# Patient Record
Sex: FEMALE | Race: WHITE | Employment: FULL TIME | URBAN - METROPOLITAN AREA
[De-identification: names, ages, dates, MRNs, and addresses within clinical notes are randomized per-mention and may not be internally consistent; named-entity substitution may affect disease eponyms.]

---

## 2018-07-03 ENCOUNTER — OFFICE VISIT (OUTPATIENT)
Dept: INTERNAL MEDICINE CLINIC | Facility: CLINIC | Age: 43
End: 2018-07-03

## 2018-07-03 VITALS
TEMPERATURE: 99 F | HEIGHT: 61 IN | HEART RATE: 80 BPM | SYSTOLIC BLOOD PRESSURE: 104 MMHG | RESPIRATION RATE: 17 BRPM | BODY MASS INDEX: 34.93 KG/M2 | WEIGHT: 185 LBS | DIASTOLIC BLOOD PRESSURE: 68 MMHG

## 2018-07-03 DIAGNOSIS — Z12.31 VISIT FOR SCREENING MAMMOGRAM: ICD-10-CM

## 2018-07-03 DIAGNOSIS — I10 ESSENTIAL HYPERTENSION: Primary | ICD-10-CM

## 2018-07-03 PROCEDURE — 99203 OFFICE O/P NEW LOW 30 MIN: CPT | Performed by: INTERNAL MEDICINE

## 2018-07-03 RX ORDER — MELOXICAM 15 MG/1
15 TABLET ORAL AS NEEDED
Refills: 1 | COMMUNITY
Start: 2018-06-13 | End: 2018-08-21 | Stop reason: ALTCHOICE

## 2018-07-03 RX ORDER — CARVEDILOL 12.5 MG/1
12.5 TABLET ORAL 2 TIMES DAILY
Refills: 4 | COMMUNITY
Start: 2018-06-21 | End: 2018-07-03

## 2018-07-03 RX ORDER — CARVEDILOL 12.5 MG/1
6.25 TABLET ORAL 2 TIMES DAILY
Refills: 4 | COMMUNITY
Start: 2018-07-03 | End: 2018-08-21

## 2018-07-03 RX ORDER — FLUOXETINE HYDROCHLORIDE 40 MG/1
40 CAPSULE ORAL DAILY
Qty: 90 CAPSULE | Refills: 0 | COMMUNITY
Start: 2018-07-03 | End: 2018-10-16

## 2018-07-03 NOTE — PROGRESS NOTES
Patient presents with:  Blood Pressure: Room 8 AH- Moved to area and needed local PCP.  Within a month is having high BP was dx'd with this after seeing her DR in fred after being seen for headaches - Started with Bystolic now on Carvedilol       HPI:  He hernias. Musculoskeletal: No edema  Lymphadenopathy: No cervical adenopathy.      A/P:    Essential hypertension  (primary encounter diagnosis)  Visit for screening mammogram  Reduce bb to 6.25mg bid; f/u in 6 weeks for cpe and bp check  No orders of the d

## 2018-07-09 ENCOUNTER — MED REC SCAN ONLY (OUTPATIENT)
Dept: INTERNAL MEDICINE CLINIC | Facility: CLINIC | Age: 43
End: 2018-07-09

## 2018-07-13 ENCOUNTER — HOSPITAL ENCOUNTER (EMERGENCY)
Facility: HOSPITAL | Age: 43
Discharge: HOME | End: 2018-07-13
Attending: EMERGENCY MEDICINE
Payer: COMMERCIAL

## 2018-07-13 ENCOUNTER — TELEPHONE (OUTPATIENT)
Dept: INTERNAL MEDICINE CLINIC | Facility: CLINIC | Age: 43
End: 2018-07-13

## 2018-07-13 ENCOUNTER — APPOINTMENT (OUTPATIENT)
Dept: CT IMAGING | Facility: HOSPITAL | Age: 43
End: 2018-07-13
Attending: EMERGENCY MEDICINE
Payer: COMMERCIAL

## 2018-07-13 VITALS
OXYGEN SATURATION: 98 % | DIASTOLIC BLOOD PRESSURE: 70 MMHG | WEIGHT: 180 LBS | HEIGHT: 61 IN | SYSTOLIC BLOOD PRESSURE: 134 MMHG | RESPIRATION RATE: 16 BRPM | HEART RATE: 69 BPM | BODY MASS INDEX: 33.99 KG/M2 | TEMPERATURE: 99 F

## 2018-07-13 DIAGNOSIS — G43.809 OTHER MIGRAINE WITHOUT STATUS MIGRAINOSUS, NOT INTRACTABLE: Primary | ICD-10-CM

## 2018-07-13 LAB
ALBUMIN SERPL-MCNC: 3.9 G/DL (ref 3.5–4.8)
ALP LIVER SERPL-CCNC: 56 U/L (ref 37–98)
ALT SERPL-CCNC: 21 U/L (ref 14–54)
AST SERPL-CCNC: 19 U/L (ref 15–41)
BASOPHILS # BLD AUTO: 0.04 X10(3) UL (ref 0–0.1)
BASOPHILS NFR BLD AUTO: 0.6 %
BILIRUB SERPL-MCNC: 0.4 MG/DL (ref 0.1–2)
BUN BLD-MCNC: 13 MG/DL (ref 8–20)
CALCIUM BLD-MCNC: 9.4 MG/DL (ref 8.3–10.3)
CHLORIDE: 104 MMOL/L (ref 101–111)
CO2: 22 MMOL/L (ref 22–32)
CREAT BLD-MCNC: 1.03 MG/DL (ref 0.55–1.02)
EOSINOPHIL # BLD AUTO: 0 X10(3) UL (ref 0–0.3)
EOSINOPHIL NFR BLD AUTO: 0 %
ERYTHROCYTE [DISTWIDTH] IN BLOOD BY AUTOMATED COUNT: 12.9 % (ref 11.5–16)
GLUCOSE BLD-MCNC: 94 MG/DL (ref 70–99)
HCT VFR BLD AUTO: 40.2 % (ref 34–50)
HGB BLD-MCNC: 13.8 G/DL (ref 12–16)
IMMATURE GRANULOCYTE COUNT: 0.02 X10(3) UL (ref 0–1)
IMMATURE GRANULOCYTE RATIO %: 0.3 %
LYMPHOCYTES # BLD AUTO: 1.1 X10(3) UL (ref 0.9–4)
LYMPHOCYTES NFR BLD AUTO: 15.4 %
M PROTEIN MFR SERPL ELPH: 7.8 G/DL (ref 6.1–8.3)
MCH RBC QN AUTO: 31.2 PG (ref 27–33.2)
MCHC RBC AUTO-ENTMCNC: 34.3 G/DL (ref 31–37)
MCV RBC AUTO: 91 FL (ref 81–100)
MONOCYTES # BLD AUTO: 0.4 X10(3) UL (ref 0.1–1)
MONOCYTES NFR BLD AUTO: 5.6 %
NEUTROPHIL ABS PRELIM: 5.59 X10 (3) UL (ref 1.3–6.7)
NEUTROPHILS # BLD AUTO: 5.59 X10(3) UL (ref 1.3–6.7)
NEUTROPHILS NFR BLD AUTO: 78.1 %
PLATELET # BLD AUTO: 230 10(3)UL (ref 150–450)
POTASSIUM SERPL-SCNC: 3.9 MMOL/L (ref 3.6–5.1)
RBC # BLD AUTO: 4.42 X10(6)UL (ref 3.8–5.1)
RED CELL DISTRIBUTION WIDTH-SD: 43.3 FL (ref 35.1–46.3)
SODIUM SERPL-SCNC: 137 MMOL/L (ref 136–144)
WBC # BLD AUTO: 7.2 X10(3) UL (ref 4–13)

## 2018-07-13 PROCEDURE — 99284 EMERGENCY DEPT VISIT MOD MDM: CPT

## 2018-07-13 PROCEDURE — 96374 THER/PROPH/DIAG INJ IV PUSH: CPT

## 2018-07-13 PROCEDURE — 96361 HYDRATE IV INFUSION ADD-ON: CPT

## 2018-07-13 PROCEDURE — 96375 TX/PRO/DX INJ NEW DRUG ADDON: CPT

## 2018-07-13 PROCEDURE — 85025 COMPLETE CBC W/AUTO DIFF WBC: CPT | Performed by: EMERGENCY MEDICINE

## 2018-07-13 PROCEDURE — 99285 EMERGENCY DEPT VISIT HI MDM: CPT

## 2018-07-13 PROCEDURE — 80053 COMPREHEN METABOLIC PANEL: CPT | Performed by: EMERGENCY MEDICINE

## 2018-07-13 PROCEDURE — 70498 CT ANGIOGRAPHY NECK: CPT | Performed by: EMERGENCY MEDICINE

## 2018-07-13 PROCEDURE — 70496 CT ANGIOGRAPHY HEAD: CPT | Performed by: EMERGENCY MEDICINE

## 2018-07-13 RX ORDER — KETOROLAC TROMETHAMINE 30 MG/ML
30 INJECTION, SOLUTION INTRAMUSCULAR; INTRAVENOUS ONCE
Status: COMPLETED | OUTPATIENT
Start: 2018-07-13 | End: 2018-07-13

## 2018-07-13 RX ORDER — DIPHENHYDRAMINE HYDROCHLORIDE 50 MG/ML
25 INJECTION INTRAMUSCULAR; INTRAVENOUS ONCE
Status: COMPLETED | OUTPATIENT
Start: 2018-07-13 | End: 2018-07-13

## 2018-07-13 RX ORDER — SUMATRIPTAN 50 MG/1
50 TABLET, FILM COATED ORAL EVERY 2 HOUR PRN
Qty: 6 TABLET | Refills: 0 | Status: SHIPPED | OUTPATIENT
Start: 2018-07-13 | End: 2018-08-12

## 2018-07-13 RX ORDER — METOCLOPRAMIDE HYDROCHLORIDE 5 MG/ML
5 INJECTION INTRAMUSCULAR; INTRAVENOUS ONCE
Status: COMPLETED | OUTPATIENT
Start: 2018-07-13 | End: 2018-07-13

## 2018-07-13 NOTE — ED INITIAL ASSESSMENT (HPI)
Pt here with headache, says it hasn't been a problem since she was started on Coreg in April. Came on around 10am while at work, tried ice and pain meds and not helping. Feeling very nauseated with some dry heaves.

## 2018-07-13 NOTE — TELEPHONE ENCOUNTER
Called and spoke with pt's , stating pt started this morning with severe HA, indicating \"worst HA she has ever had\". Nausea. No vomiting. Weakness. Light sensitivity- lying on floor in dark room at work with ice bag on head. Unknown if fever.  No n

## 2018-07-13 NOTE — TELEPHONE ENCOUNTER
Pt's , Nikki Lopez, on [facility], calling to say Pt is experiencing one of the worst headaches she has ever had, She is nauseated, can't get out of bed. It started today July 13 at around 10:00 am and the pain has increased.  Pt is still at work in a dark room

## 2018-07-14 NOTE — ED PROVIDER NOTES
Patient Seen in: [facility] Emergency Department    History   Patient presents with:  Headache (neurologic)    Stated Complaint: ha    HPI    She with a past medical history of multiple headaches that were diagnosed this year has been related to her Temp 98.6 °F (37 °C) (Temporal)   Resp 16   Ht 154.9 cm (5' 1\")   Wt 81.6 kg   LMP 07/02/2018 (Exact Date)   SpO2 98%   BMI 34.01 kg/m²         Physical Exam   Constitutional: She is oriented to person, place, and time.  She appears well-developed and wel DIFFERENTIAL[[id number]]                              Final result                 Please view results for these tests on the individual orders.    RAINBOW DRAW BLUE   RAINBOW DRAW LAVENDER   RAINBOW DRAW LIGHT GREEN   RAINBOW DRAW GOLD   CBC W/ DIFFERENTIAL tablets/24 hours. , Print Script, Disp-6 tablet, R-0

## 2018-08-20 ENCOUNTER — HOSPITAL ENCOUNTER (OUTPATIENT)
Dept: MAMMOGRAPHY | Age: 43
Discharge: HOME | End: 2018-08-20
Attending: INTERNAL MEDICINE
Payer: COMMERCIAL

## 2018-08-20 DIAGNOSIS — Z12.31 VISIT FOR SCREENING MAMMOGRAM: ICD-10-CM

## 2018-08-20 PROCEDURE — 77067 SCR MAMMO BI INCL CAD: CPT | Performed by: INTERNAL MEDICINE

## 2018-08-21 ENCOUNTER — OFFICE VISIT (OUTPATIENT)
Dept: INTERNAL MEDICINE CLINIC | Facility: CLINIC | Age: 43
End: 2018-08-21

## 2018-08-21 VITALS
WEIGHT: 186 LBS | DIASTOLIC BLOOD PRESSURE: 84 MMHG | HEIGHT: 61 IN | BODY MASS INDEX: 35.12 KG/M2 | SYSTOLIC BLOOD PRESSURE: 122 MMHG | HEART RATE: 76 BPM | TEMPERATURE: 98 F

## 2018-08-21 DIAGNOSIS — G43.109 MIGRAINE WITH AURA AND WITHOUT STATUS MIGRAINOSUS, NOT INTRACTABLE: ICD-10-CM

## 2018-08-21 DIAGNOSIS — I10 ESSENTIAL HYPERTENSION: Primary | ICD-10-CM

## 2018-08-21 PROCEDURE — 99213 OFFICE O/P EST LOW 20 MIN: CPT | Performed by: INTERNAL MEDICINE

## 2018-08-21 PROCEDURE — 93000 ELECTROCARDIOGRAM COMPLETE: CPT | Performed by: INTERNAL MEDICINE

## 2018-08-21 RX ORDER — CARVEDILOL 6.25 MG/1
6.25 TABLET ORAL 2 TIMES DAILY WITH MEALS
Qty: 180 TABLET | Refills: 3 | Status: SHIPPED | OUTPATIENT
Start: 2018-08-21 | End: 2018-10-16

## 2018-08-21 RX ORDER — SUMATRIPTAN 50 MG/1
50 TABLET, FILM COATED ORAL EVERY 4 HOURS PRN
COMMUNITY
End: 2018-08-21

## 2018-08-21 RX ORDER — SUMATRIPTAN 50 MG/1
50 TABLET, FILM COATED ORAL EVERY 4 HOURS PRN
Qty: 24 TABLET | Refills: 0 | Status: SHIPPED | OUTPATIENT
Start: 2018-08-21 | End: 2020-07-31

## 2018-08-21 NOTE — PROGRESS NOTES
Patient presents with:  Blood Pressure: LG. Room 8. 6 week follow up for b/p check       HPI:  Here for f/u htn, doing well on lower dose bb no se's. Had a migrain in mid July and cross with cta brain was neg, doing better now.      Review of Systems   No f/ defined types were placed in this encounter.       Meds & Refills for this Visit:  Signed Prescriptions Disp Refills    SUMAtriptan Succinate 50 MG Oral Tab 24 tablet 0      Sig: Take 1 tablet (50 mg total) by mouth every 4 (four) hours as needed for [employer]

## 2018-09-11 ENCOUNTER — LAB ENCOUNTER (OUTPATIENT)
Dept: LAB | Age: 43
End: 2018-09-11
Attending: NURSE PRACTITIONER
Payer: COMMERCIAL

## 2018-09-11 ENCOUNTER — OFFICE VISIT (OUTPATIENT)
Dept: INTERNAL MEDICINE CLINIC | Facility: CLINIC | Age: 43
End: 2018-09-11
Payer: COMMERCIAL

## 2018-09-11 VITALS
DIASTOLIC BLOOD PRESSURE: 70 MMHG | HEIGHT: 61 IN | SYSTOLIC BLOOD PRESSURE: 118 MMHG | OXYGEN SATURATION: 99 % | TEMPERATURE: 99 F | HEART RATE: 71 BPM | BODY MASS INDEX: 35.12 KG/M2 | WEIGHT: 186 LBS

## 2018-09-11 DIAGNOSIS — Z11.51 SCREENING FOR HPV (HUMAN PAPILLOMAVIRUS): ICD-10-CM

## 2018-09-11 DIAGNOSIS — F41.1 ANXIETY STATE: ICD-10-CM

## 2018-09-11 DIAGNOSIS — Z00.00 ROUTINE GENERAL MEDICAL EXAMINATION AT A HEALTH CARE FACILITY: Primary | ICD-10-CM

## 2018-09-11 DIAGNOSIS — G43.109 MIGRAINE WITH AURA AND WITHOUT STATUS MIGRAINOSUS, NOT INTRACTABLE: ICD-10-CM

## 2018-09-11 DIAGNOSIS — Z00.00 LABORATORY EXAMINATION ORDERED AS PART OF A COMPLETE PHYSICAL EXAMINATION: ICD-10-CM

## 2018-09-11 DIAGNOSIS — Z11.59 SCREENING FOR GERMAN MEASLES: ICD-10-CM

## 2018-09-11 DIAGNOSIS — Z12.4 SCREENING FOR MALIGNANT NEOPLASM OF CERVIX: ICD-10-CM

## 2018-09-11 DIAGNOSIS — Z11.59 SCREENING FOR MEASLES: ICD-10-CM

## 2018-09-11 DIAGNOSIS — Z11.59 SCREENING FOR VIRAL DISEASE: ICD-10-CM

## 2018-09-11 DIAGNOSIS — F43.10 POSTTRAUMATIC STRESS DISORDER: ICD-10-CM

## 2018-09-11 DIAGNOSIS — F39 EPISODIC MOOD DISORDER (HCC): ICD-10-CM

## 2018-09-11 LAB
CHOLEST SMN-MCNC: 187 MG/DL (ref ?–200)
HDLC SERPL-MCNC: 48 MG/DL (ref 40–59)
INDURATION (): 0 MM (ref 0–11)
LDLC SERPL CALC-MCNC: 111 MG/DL (ref ?–100)
NONHDLC SERPL-MCNC: 139 MG/DL (ref ?–130)
RUBV IGG SER QL: POSITIVE
RUBV IGG SER-ACNC: 75.2 IU/ML (ref 10–?)
TRIGL SERPL-MCNC: 142 MG/DL (ref 30–149)
TSI SER-ACNC: 1.02 MIU/ML (ref 0.35–5.5)
VLDLC SERPL CALC-MCNC: 28 MG/DL (ref 0–30)

## 2018-09-11 PROCEDURE — 86762 RUBELLA ANTIBODY: CPT | Performed by: NURSE PRACTITIONER

## 2018-09-11 PROCEDURE — 86787 VARICELLA-ZOSTER ANTIBODY: CPT | Performed by: NURSE PRACTITIONER

## 2018-09-11 PROCEDURE — 90715 TDAP VACCINE 7 YRS/> IM: CPT | Performed by: NURSE PRACTITIONER

## 2018-09-11 PROCEDURE — 90471 IMMUNIZATION ADMIN: CPT | Performed by: NURSE PRACTITIONER

## 2018-09-11 PROCEDURE — 86580 TB INTRADERMAL TEST: CPT | Performed by: NURSE PRACTITIONER

## 2018-09-11 PROCEDURE — 99396 PREV VISIT EST AGE 40-64: CPT | Performed by: NURSE PRACTITIONER

## 2018-09-11 PROCEDURE — 80061 LIPID PANEL: CPT | Performed by: NURSE PRACTITIONER

## 2018-09-11 PROCEDURE — 86735 MUMPS ANTIBODY: CPT | Performed by: NURSE PRACTITIONER

## 2018-09-11 PROCEDURE — 88175 CYTOPATH C/V AUTO FLUID REDO: CPT | Performed by: NURSE PRACTITIONER

## 2018-09-11 PROCEDURE — 84443 ASSAY THYROID STIM HORMONE: CPT | Performed by: NURSE PRACTITIONER

## 2018-09-11 PROCEDURE — 86765 RUBEOLA ANTIBODY: CPT | Performed by: NURSE PRACTITIONER

## 2018-09-11 PROCEDURE — 87624 HPV HI-RISK TYP POOLED RSLT: CPT | Performed by: NURSE PRACTITIONER

## 2018-09-11 NOTE — PROGRESS NOTES
Paxton Clarke is a 36year old female who presents for a complete physical exam:       Patient complains of:   Needs forms completed for school  Becoming MA. Migraine  Some increase since lowering beta blocker.    Her bp was too low with larger amount Depression Brother    • Schizophrenia Paternal Aunt         possible   • Breast Cancer Other       Social History    Tobacco Use      Smoking status: Never Smoker      Smokeless tobacco: Never Used    Alcohol use:  Yes      Alcohol/week: 0.6 oz      Types: distress  SKIN: no rashes,no suspicious lesions  HEENT: atraumatic, normocephalic,ears and throat are clear  EYES:PERRLA, EOMI, conjunctiva are clear  NECK: supple,no adenopathy,  CHEST: no chest tenderness  BREAST: no dominant or suspicious mass  LUNGS: c

## 2018-09-12 LAB — HPV I/H RISK 1 DNA SPEC QL NAA+PROBE: NEGATIVE

## 2018-09-14 ENCOUNTER — NURSE ONLY (OUTPATIENT)
Dept: INTERNAL MEDICINE CLINIC | Facility: CLINIC | Age: 43
End: 2018-09-14

## 2018-09-14 LAB
MEV IGG SER IA-ACNC: POSITIVE
MUV IGG SER IA-ACNC: POSITIVE
VZV IGG SER IA-ACNC: POSITIVE

## 2018-09-18 ENCOUNTER — NURSE ONLY (OUTPATIENT)
Dept: INTERNAL MEDICINE CLINIC | Facility: CLINIC | Age: 43
End: 2018-09-18
Payer: COMMERCIAL

## 2018-09-18 PROCEDURE — 86580 TB INTRADERMAL TEST: CPT | Performed by: NURSE PRACTITIONER

## 2018-09-21 ENCOUNTER — NURSE ONLY (OUTPATIENT)
Dept: INTERNAL MEDICINE CLINIC | Facility: CLINIC | Age: 43
End: 2018-09-21

## 2018-09-21 LAB — INDURATION (): 0 MM (ref 0–11)

## 2018-09-28 ENCOUNTER — TELEPHONE (OUTPATIENT)
Dept: INTERNAL MEDICINE CLINIC | Facility: CLINIC | Age: 43
End: 2018-09-28

## 2018-09-28 DIAGNOSIS — Z11.59 NEED FOR HEPATITIS B SCREENING TEST: Primary | ICD-10-CM

## 2018-09-28 NOTE — TELEPHONE ENCOUNTER
SD-patient requesting Hep B titer for school, order pended for your review and approval if ok. Please advise. Thank you.

## 2018-09-28 NOTE — TELEPHONE ENCOUNTER
Pt calling to find out if recent BW included a titer for HEP B. If not Pt needs an order to come back in. If she did, pls call Pt and have her bring in the PW to be initialed that it was done. Pt has to start clinicals in 2 weeks.  So time is of the ess

## 2018-09-28 NOTE — TELEPHONE ENCOUNTER
Spoke with patient informed per SD placed order for Hep B titer to be completed. Patient verbalized understanding and agreeable to POC.    States some of the forms that were completed are missing initials from the provider, she will be dropping off the form

## 2018-10-02 ENCOUNTER — APPOINTMENT (OUTPATIENT)
Dept: LAB | Age: 43
End: 2018-10-02
Attending: NURSE PRACTITIONER
Payer: COMMERCIAL

## 2018-10-02 ENCOUNTER — TELEPHONE (OUTPATIENT)
Dept: INTERNAL MEDICINE CLINIC | Facility: CLINIC | Age: 43
End: 2018-10-02

## 2018-10-02 DIAGNOSIS — Z11.59 NEED FOR HEPATITIS B SCREENING TEST: ICD-10-CM

## 2018-10-02 PROCEDURE — 86706 HEP B SURFACE ANTIBODY: CPT | Performed by: NURSE PRACTITIONER

## 2018-10-02 NOTE — TELEPHONE ENCOUNTER
Pt came in today to have labs done for her Heb B titer-she also dropped off a form for us to fill in the results and also initial the boxes on the form for the Hep B and TD box needs to be initialed as well-form in fax bin up front for SD to hold til we ge

## 2018-10-10 NOTE — TELEPHONE ENCOUNTER
Pt called for status of PW. Per Vitaliy Peterson, PW will be filled out and they will call her by days end.

## 2018-10-16 ENCOUNTER — TELEPHONE (OUTPATIENT)
Dept: INTERNAL MEDICINE CLINIC | Facility: CLINIC | Age: 43
End: 2018-10-16

## 2018-10-16 RX ORDER — CARVEDILOL 6.25 MG/1
6.25 TABLET ORAL 2 TIMES DAILY WITH MEALS
Qty: 180 TABLET | Refills: 3 | Status: SHIPPED | OUTPATIENT
Start: 2018-10-16 | End: 2019-10-22

## 2018-10-16 RX ORDER — FLUOXETINE HYDROCHLORIDE 40 MG/1
40 CAPSULE ORAL DAILY
Qty: 90 CAPSULE | Refills: 0 | Status: SHIPPED | OUTPATIENT
Start: 2018-10-16 | End: 2019-02-10

## 2018-10-16 NOTE — TELEPHONE ENCOUNTER
LOV:9/11/18 SD  FOV:none on file  LAST RX:7/3/18 40 mg take one cap daily 90 caps 0 refills   LAST LABS:10/2/18 hep B surface antibody  9/11/18 lipids,tsh,mmr,varicella,TB  PER PROTOCOL: to provider

## 2018-10-16 NOTE — TELEPHONE ENCOUNTER
Patient dropped off 2 forms;  [employer] Preventative verification form for completion and [employer] Student release of information form. Please call patient when completed.   Patient needs the school form by tomorrow and the [employer] form by [place]

## 2018-10-16 NOTE — TELEPHONE ENCOUNTER
Patient brought in  script , script was sent electronically due to being  .  Patient preferred the [address]

## 2018-10-17 NOTE — TELEPHONE ENCOUNTER
Left detailed message ok per hipaa to notify pt forms are ready for . Forms placed at the . Copy of forms sent to scan.

## 2019-02-11 RX ORDER — FLUOXETINE HYDROCHLORIDE 40 MG/1
CAPSULE ORAL
Qty: 90 CAPSULE | Refills: 0 | Status: SHIPPED | OUTPATIENT
Start: 2019-02-11 | End: 2019-05-15

## 2019-02-11 NOTE — TELEPHONE ENCOUNTER
LFV:09/11/18 with SD  Future Appt: none on file, not due back until March  Last Rx:10/16/18 for 90 days  Last Labs:09/11/18 lipid, tsh  Per protocol

## 2019-02-27 ENCOUNTER — HOSPITAL ENCOUNTER (EMERGENCY)
Facility: HOSPITAL | Age: 44
Discharge: HOME | End: 2019-02-27
Attending: EMERGENCY MEDICINE
Payer: COMMERCIAL

## 2019-02-27 ENCOUNTER — APPOINTMENT (OUTPATIENT)
Dept: GENERAL RADIOLOGY | Facility: HOSPITAL | Age: 44
End: 2019-02-27
Payer: COMMERCIAL

## 2019-02-27 ENCOUNTER — APPOINTMENT (OUTPATIENT)
Dept: CV DIAGNOSTICS | Facility: HOSPITAL | Age: 44
End: 2019-02-27
Attending: EMERGENCY MEDICINE
Payer: COMMERCIAL

## 2019-02-27 ENCOUNTER — TELEPHONE (OUTPATIENT)
Dept: INTERNAL MEDICINE CLINIC | Facility: CLINIC | Age: 44
End: 2019-02-27

## 2019-02-27 VITALS
DIASTOLIC BLOOD PRESSURE: 58 MMHG | HEART RATE: 92 BPM | OXYGEN SATURATION: 96 % | WEIGHT: 186 LBS | BODY MASS INDEX: 35.12 KG/M2 | TEMPERATURE: 99 F | HEIGHT: 61 IN | SYSTOLIC BLOOD PRESSURE: 102 MMHG | RESPIRATION RATE: 20 BRPM

## 2019-02-27 DIAGNOSIS — R07.89 CHEST PAIN, ATYPICAL: Primary | ICD-10-CM

## 2019-02-27 LAB
ALBUMIN SERPL-MCNC: 3.7 G/DL (ref 3.4–5)
ALBUMIN/GLOB SERPL: 1 {RATIO} (ref 1–2)
ALP LIVER SERPL-CCNC: 60 U/L (ref 37–98)
ALT SERPL-CCNC: 17 U/L (ref 13–56)
ANION GAP SERPL CALC-SCNC: 7 MMOL/L (ref 0–18)
AST SERPL-CCNC: 11 U/L (ref 15–37)
BASOPHILS # BLD AUTO: 0.03 X10(3) UL (ref 0–0.2)
BASOPHILS NFR BLD AUTO: 0.5 %
BILIRUB SERPL-MCNC: 0.3 MG/DL (ref 0.1–2)
BUN BLD-MCNC: 17 MG/DL (ref 7–18)
BUN/CREAT SERPL: 16.7 (ref 10–20)
CALCIUM BLD-MCNC: 9.1 MG/DL (ref 8.5–10.1)
CHLORIDE SERPL-SCNC: 108 MMOL/L (ref 98–107)
CO2 SERPL-SCNC: 24 MMOL/L (ref 21–32)
CREAT BLD-MCNC: 1.02 MG/DL (ref 0.55–1.02)
DEPRECATED RDW RBC AUTO: 44 FL (ref 35.1–46.3)
EOSINOPHIL # BLD AUTO: 0.01 X10(3) UL (ref 0–0.7)
EOSINOPHIL NFR BLD AUTO: 0.2 %
ERYTHROCYTE [DISTWIDTH] IN BLOOD BY AUTOMATED COUNT: 13.3 % (ref 11–15)
GLOBULIN PLAS-MCNC: 3.8 G/DL (ref 2.8–4.4)
GLUCOSE BLD-MCNC: 85 MG/DL (ref 70–99)
HCT VFR BLD AUTO: 38.3 % (ref 35–48)
HGB BLD-MCNC: 13.6 G/DL (ref 12–16)
IMM GRANULOCYTES # BLD AUTO: 0.01 X10(3) UL (ref 0–1)
IMM GRANULOCYTES NFR BLD: 0.2 %
LYMPHOCYTES # BLD AUTO: 1.49 X10(3) UL (ref 1–4)
LYMPHOCYTES NFR BLD AUTO: 24.5 %
M PROTEIN MFR SERPL ELPH: 7.5 G/DL (ref 6.4–8.2)
MCH RBC QN AUTO: 32.2 PG (ref 26–34)
MCHC RBC AUTO-ENTMCNC: 35.5 G/DL (ref 31–37)
MCV RBC AUTO: 90.8 FL (ref 80–100)
MONOCYTES # BLD AUTO: 0.55 X10(3) UL (ref 0.1–1)
MONOCYTES NFR BLD AUTO: 9 %
NEUTROPHILS # BLD AUTO: 4 X10 (3) UL (ref 1.5–7.7)
NEUTROPHILS # BLD AUTO: 4 X10(3) UL (ref 1.5–7.7)
NEUTROPHILS NFR BLD AUTO: 65.6 %
OSMOLALITY SERPL CALC.SUM OF ELEC: 289 MOSM/KG (ref 275–295)
PLATELET # BLD AUTO: 228 10(3)UL (ref 150–450)
POTASSIUM SERPL-SCNC: 4.2 MMOL/L (ref 3.5–5.1)
RBC # BLD AUTO: 4.22 X10(6)UL (ref 3.8–5.3)
SODIUM SERPL-SCNC: 139 MMOL/L (ref 136–145)
TROPONIN I SERPL-MCNC: <0.045 NG/ML (ref ?–0.04)
WBC # BLD AUTO: 6.1 X10(3) UL (ref 4–11)

## 2019-02-27 PROCEDURE — 93010 ELECTROCARDIOGRAM REPORT: CPT

## 2019-02-27 PROCEDURE — 99285 EMERGENCY DEPT VISIT HI MDM: CPT

## 2019-02-27 PROCEDURE — 93005 ELECTROCARDIOGRAM TRACING: CPT

## 2019-02-27 PROCEDURE — 85025 COMPLETE CBC W/AUTO DIFF WBC: CPT

## 2019-02-27 PROCEDURE — 84484 ASSAY OF TROPONIN QUANT: CPT

## 2019-02-27 PROCEDURE — 80053 COMPREHEN METABOLIC PANEL: CPT | Performed by: EMERGENCY MEDICINE

## 2019-02-27 PROCEDURE — 71045 X-RAY EXAM CHEST 1 VIEW: CPT

## 2019-02-27 PROCEDURE — 85025 COMPLETE CBC W/AUTO DIFF WBC: CPT | Performed by: EMERGENCY MEDICINE

## 2019-02-27 PROCEDURE — 84484 ASSAY OF TROPONIN QUANT: CPT | Performed by: EMERGENCY MEDICINE

## 2019-02-27 PROCEDURE — 93350 STRESS TTE ONLY: CPT | Performed by: EMERGENCY MEDICINE

## 2019-02-27 PROCEDURE — 36415 COLL VENOUS BLD VENIPUNCTURE: CPT

## 2019-02-27 PROCEDURE — 80053 COMPREHEN METABOLIC PANEL: CPT

## 2019-02-27 PROCEDURE — 93017 CV STRESS TEST TRACING ONLY: CPT | Performed by: EMERGENCY MEDICINE

## 2019-02-27 PROCEDURE — 93018 CV STRESS TEST I&R ONLY: CPT | Performed by: EMERGENCY MEDICINE

## 2019-02-27 RX ORDER — OMEPRAZOLE 40 MG/1
40 CAPSULE, DELAYED RELEASE ORAL DAILY
Qty: 30 CAPSULE | Refills: 0 | Status: SHIPPED | OUTPATIENT
Start: 2019-02-27 | End: 2019-03-19

## 2019-02-27 RX ORDER — ASPIRIN 81 MG/1
324 TABLET, CHEWABLE ORAL ONCE
Status: COMPLETED | OUTPATIENT
Start: 2019-02-27 | End: 2019-02-27

## 2019-02-27 NOTE — TELEPHONE ENCOUNTER
Patient calling today to report chest pressure that woke her up last night several times. She has a hx of elevated BP and is on carvedilol higher dose in the past reduced by AS  but has been no elevations while checking at home.   Did have two episodes at

## 2019-02-27 NOTE — ED PROVIDER NOTES
Patient Seen in: [facility] Emergency Department    History   Patient presents with:  Chest Pain Angina (cardiovascular)    Stated Complaint: CHEST PAIN    HPI    Patient is a 78-year-old female comes in emergency room for evaluation of chest pain. (37.1 °C) (Temporal)   Resp 20   Ht 154.9 cm (5' 1\")   Wt 84.4 kg   LMP 01/28/2019   SpO2 96%   BMI 35.14 kg/m²         Physical Exam    GENERAL: No acute distress, well appearing and non-toxic, Alert and oriented X 3   HEENT: Normocephalic, atraumatic. No acute changes              Xr Chest Ap Portable  (cpt=71045)    Result Date: 2/27/2019  PROCEDURE:  XR CHEST AP PORTABLE  (CPT=71045)  TECHNIQUE:  AP chest radiograph was obtained. COMPARISON:  None.   INDICATIONS:  chest pain  PATIENT STATED HISTORY: ( concerning, new, changing or persisting symptoms. I discussed the case with the patient and they had no questions, complaints, or concerns. Patient felt comfortable going home.         Disposition and Plan     Clinical Impression:  Chest pain, atypical  (

## 2019-02-27 NOTE — TELEPHONE ENCOUNTER
pt shoaib trejo [birth year] c/o Chest tightness, periodic pain around side of neck and mid back, extremely tired and feels like 4 pound weight on her chest-can someone please talk to her-she did make appt w/AS today at 4:00-sent to triage

## 2019-02-27 NOTE — ED INITIAL ASSESSMENT (HPI)
Pt here for chest pain that started a couple weeks, increasing this past night. Pt has hx of high BP. Pt denies n,v,d or fever.

## 2019-02-28 ENCOUNTER — TELEPHONE (OUTPATIENT)
Dept: INTERNAL MEDICINE CLINIC | Facility: CLINIC | Age: 44
End: 2019-02-28

## 2019-02-28 LAB
ATRIAL RATE: 85 BPM
P AXIS: 63 DEGREES
P-R INTERVAL: 156 MS
Q-T INTERVAL: 390 MS
QRS DURATION: 82 MS
QTC CALCULATION (BEZET): 464 MS
R AXIS: 68 DEGREES
T AXIS: 56 DEGREES
VENTRICULAR RATE: 85 BPM

## 2019-02-28 NOTE — TELEPHONE ENCOUNTER
Please is returning our call re:Echo results. Please call patient at work; [employer]--[phone] and ask for patient. Sorry, closed the encounter by accident.

## 2019-02-28 NOTE — TELEPHONE ENCOUNTER
Spoke to pt, reviewed Stress Echo results with pt. Scheduled ER f/u with AS for 3/5/19 at 4pm.  Pt verbalizes understanding.

## 2019-03-19 ENCOUNTER — OFFICE VISIT (OUTPATIENT)
Dept: INTERNAL MEDICINE CLINIC | Facility: CLINIC | Age: 44
End: 2019-03-19
Payer: COMMERCIAL

## 2019-03-19 VITALS
HEART RATE: 76 BPM | HEIGHT: 61 IN | WEIGHT: 191 LBS | BODY MASS INDEX: 36.06 KG/M2 | TEMPERATURE: 98 F | SYSTOLIC BLOOD PRESSURE: 108 MMHG | DIASTOLIC BLOOD PRESSURE: 60 MMHG

## 2019-03-19 DIAGNOSIS — R07.89 ATYPICAL CHEST PAIN: Primary | ICD-10-CM

## 2019-03-19 DIAGNOSIS — R00.2 PALPITATIONS: ICD-10-CM

## 2019-03-19 PROCEDURE — 99213 OFFICE O/P EST LOW 20 MIN: CPT | Performed by: INTERNAL MEDICINE

## 2019-03-20 NOTE — PROGRESS NOTES
Patient presents with:  Test Results: LG. Room 9. HPI:  Here for f/u atypical chest pain, left shoulder an neck, intermittent non exertional. Went to er, neg w/u including cxr, labs and stress test. Symptoms resolved. Feels well.  Still with [place] No edema  Lymphadenopathy: No cervical adenopathy. Neurological: No defecits  Skin: Skin is warm and dry. No rash. Psychiatric: Normal mood and affect.      A/P:    Palpitations  Atypical chest pain  (primary encounter diagnosis)  Get holter for w/u palp

## 2019-03-26 ENCOUNTER — HOSPITAL ENCOUNTER (OUTPATIENT)
Dept: CV DIAGNOSTICS | Facility: HOSPITAL | Age: 44
Discharge: HOME | End: 2019-03-26
Attending: INTERNAL MEDICINE
Payer: COMMERCIAL

## 2019-03-26 DIAGNOSIS — R00.2 PALPITATIONS: ICD-10-CM

## 2019-03-26 PROCEDURE — 93225 XTRNL ECG REC<48 HRS REC: CPT | Performed by: INTERNAL MEDICINE

## 2019-03-26 PROCEDURE — 93226 XTRNL ECG REC<48 HR SCAN A/R: CPT | Performed by: INTERNAL MEDICINE

## 2019-03-26 PROCEDURE — 93227 XTRNL ECG REC<48 HR R&I: CPT | Performed by: INTERNAL MEDICINE

## 2019-04-01 ENCOUNTER — TELEPHONE (OUTPATIENT)
Dept: INTERNAL MEDICINE CLINIC | Facility: CLINIC | Age: 44
End: 2019-04-01

## 2019-05-16 RX ORDER — FLUOXETINE HYDROCHLORIDE 40 MG/1
CAPSULE ORAL
Qty: 90 CAPSULE | Refills: 0 | Status: SHIPPED | OUTPATIENT
Start: 2019-05-16 | End: 2019-08-20

## 2019-05-16 NOTE — TELEPHONE ENCOUNTER
Last Office Visit: 3-19-19 with AS for test results  Last Rx Filled: 2-11-19 90 caps with no refills   Last Labs: 2-27-19 cbc/cmp  Future Appointment: none    Per protocol to provider

## 2019-07-11 ENCOUNTER — TELEPHONE (OUTPATIENT)
Dept: INTERNAL MEDICINE CLINIC | Facility: CLINIC | Age: 44
End: 2019-07-11

## 2019-07-11 NOTE — TELEPHONE ENCOUNTER
Pt called and needs proof of her TDAP shot and her MMR and Varicella titer that was drawn. Printed records off and pt will come and pick them up. . Placed up front

## 2019-08-20 RX ORDER — FLUOXETINE HYDROCHLORIDE 40 MG/1
CAPSULE ORAL
Qty: 30 CAPSULE | Refills: 0 | Status: SHIPPED | OUTPATIENT
Start: 2019-08-20 | End: 2019-09-19

## 2019-08-20 NOTE — TELEPHONE ENCOUNTER
LOV: 3/19/19 w/ AS  FOV: None  Last labs: 2/27/19 CMC,CMP,Troponin  Last Refill: 5/16/19 qt:90    Per protocol routed to provider

## 2019-09-19 NOTE — TELEPHONE ENCOUNTER
LOV:3/19/19 AS  FOV:none on file  LAST RX:8/20/19 40 mg take 1 cap daily 30 caps 0 refills   LAST LABS:2/27/19 cbc,cmp,troponin  PER PROTOCOL: to provider

## 2019-09-20 RX ORDER — FLUOXETINE HYDROCHLORIDE 40 MG/1
CAPSULE ORAL
Qty: 30 CAPSULE | Refills: 0 | Status: SHIPPED | OUTPATIENT
Start: 2019-09-20 | End: 2019-10-22

## 2019-10-22 RX ORDER — FLUOXETINE HYDROCHLORIDE 40 MG/1
CAPSULE ORAL
Qty: 30 CAPSULE | Refills: 0 | Status: SHIPPED | OUTPATIENT
Start: 2019-10-22 | End: 2019-11-21

## 2019-10-22 RX ORDER — CARVEDILOL 6.25 MG/1
TABLET ORAL
Qty: 60 TABLET | Refills: 2 | Status: SHIPPED | OUTPATIENT
Start: 2019-10-22 | End: 2020-07-31

## 2019-10-22 NOTE — TELEPHONE ENCOUNTER
Last VISIT 03/19/19  Last REFILL 09/20/19 Fluoxetine qty 30 w/0 refills. 10/16/2018 Carvedilol qty 180 w/3 refills    Last LABS 02/27/19 CBC, CMP Troponin I  No future appointments. Per PROTOCOL? Failed      Please Approve or Deny.

## 2019-10-26 RX ORDER — FLUOXETINE HYDROCHLORIDE 40 MG/1
CAPSULE ORAL
Qty: 30 CAPSULE | Refills: 0 | OUTPATIENT
Start: 2019-10-26

## 2019-11-21 RX ORDER — FLUOXETINE HYDROCHLORIDE 40 MG/1
CAPSULE ORAL
Qty: 30 CAPSULE | Refills: 0 | Status: SHIPPED | OUTPATIENT
Start: 2019-11-21 | End: 2020-07-31

## 2019-11-21 NOTE — TELEPHONE ENCOUNTER
Last ov:  3/19/19  Last physical:  9/11/18    Last refill:FLUOXETINE HCL 40 MG Oral Cap   10/22/19    Quantity:#30, no refills    Related labs: trop/cmp/cbc  2/27/19    Future appointments: none set. Saw you for CP in march and was to f/u in 6mo.  Has not s

## 2020-07-31 ENCOUNTER — OFFICE VISIT (OUTPATIENT)
Dept: INTERNAL MEDICINE CLINIC | Facility: CLINIC | Age: 45
End: 2020-07-31
Payer: COMMERCIAL

## 2020-07-31 VITALS
HEIGHT: 61 IN | HEART RATE: 68 BPM | WEIGHT: 195 LBS | SYSTOLIC BLOOD PRESSURE: 102 MMHG | DIASTOLIC BLOOD PRESSURE: 70 MMHG | BODY MASS INDEX: 36.82 KG/M2 | TEMPERATURE: 99 F

## 2020-07-31 DIAGNOSIS — F41.1 ANXIETY STATE: ICD-10-CM

## 2020-07-31 DIAGNOSIS — Z12.31 ENCOUNTER FOR SCREENING MAMMOGRAM FOR MALIGNANT NEOPLASM OF BREAST: ICD-10-CM

## 2020-07-31 DIAGNOSIS — Z13.220 SCREENING, LIPID: ICD-10-CM

## 2020-07-31 DIAGNOSIS — G43.109 MIGRAINE WITH AURA AND WITHOUT STATUS MIGRAINOSUS, NOT INTRACTABLE: ICD-10-CM

## 2020-07-31 DIAGNOSIS — Z00.00 ROUTINE GENERAL MEDICAL EXAMINATION AT A HEALTH CARE FACILITY: Primary | ICD-10-CM

## 2020-07-31 DIAGNOSIS — Z13.0 SCREENING FOR BLOOD DISEASE: ICD-10-CM

## 2020-07-31 DIAGNOSIS — Z13.29 SCREENING FOR THYROID DISORDER: ICD-10-CM

## 2020-07-31 DIAGNOSIS — Z13.1 SCREENING FOR DIABETES MELLITUS: ICD-10-CM

## 2020-07-31 PROCEDURE — 3074F SYST BP LT 130 MM HG: CPT | Performed by: NURSE PRACTITIONER

## 2020-07-31 PROCEDURE — 99396 PREV VISIT EST AGE 40-64: CPT | Performed by: NURSE PRACTITIONER

## 2020-07-31 PROCEDURE — 3008F BODY MASS INDEX DOCD: CPT | Performed by: NURSE PRACTITIONER

## 2020-07-31 PROCEDURE — 3078F DIAST BP <80 MM HG: CPT | Performed by: NURSE PRACTITIONER

## 2020-07-31 RX ORDER — CARVEDILOL 6.25 MG/1
6.25 TABLET ORAL 2 TIMES DAILY WITH MEALS
Qty: 180 TABLET | Refills: 3 | Status: SHIPPED | OUTPATIENT
Start: 2020-07-31 | End: 2021-11-01

## 2020-07-31 RX ORDER — SUMATRIPTAN 50 MG/1
50 TABLET, FILM COATED ORAL EVERY 4 HOURS PRN
Qty: 24 TABLET | Refills: 3 | Status: SHIPPED | OUTPATIENT
Start: 2020-07-31

## 2020-07-31 RX ORDER — FLUOXETINE HYDROCHLORIDE 40 MG/1
40 CAPSULE ORAL DAILY
Qty: 90 CAPSULE | Refills: 3 | Status: SHIPPED | OUTPATIENT
Start: 2020-07-31 | End: 2021-09-21

## 2020-07-31 RX ORDER — FLUTICASONE PROPIONATE 50 MCG
1 SPRAY, SUSPENSION (ML) NASAL 2 TIMES DAILY
Qty: 1 BOTTLE | Refills: 3 | Status: SHIPPED | OUTPATIENT
Start: 2020-07-31

## 2020-07-31 NOTE — PROGRESS NOTES
Felicita Ball is a 38year old female who presents for a complete physical exam:       Patient complains of:  Notes reviewed. Was seen at [facility]. Anxiety/ depression   Fluoxetine. Doing well      Migraines  carvediolol 6.25mg bid. imitrex PRN. Brother    • Substance Abuse Brother    • Anxiety Brother    • Depression Sister    • Depression Brother    • Schizophrenia Paternal Aunt         possible   • Breast Cancer Other            Health Maintenance  Immunizations:     Immunization History  Admin clear  NECK: supple,no adenopathy,  CHEST: no chest tenderness  BREAST: no dominant or suspicious mass  LUNGS: clear to auscultation  CARDIO: RRR without murmur  GI: good BS's,no masses, HSM or tenderness  MUSCULOSKELETAL: back is not tender,  EXTREMITIES:

## 2020-08-16 ENCOUNTER — HOSPITAL ENCOUNTER (OUTPATIENT)
Age: 45
Discharge: HOME | End: 2020-08-16
Payer: COMMERCIAL

## 2020-08-16 VITALS
SYSTOLIC BLOOD PRESSURE: 138 MMHG | TEMPERATURE: 98 F | HEART RATE: 89 BPM | OXYGEN SATURATION: 97 % | RESPIRATION RATE: 18 BRPM | DIASTOLIC BLOOD PRESSURE: 78 MMHG

## 2020-08-16 DIAGNOSIS — R19.7 DIARRHEA, UNSPECIFIED TYPE: Primary | ICD-10-CM

## 2020-08-16 PROCEDURE — U0003 INFECTIOUS AGENT DETECTION BY NUCLEIC ACID (DNA OR RNA); SEVERE ACUTE RESPIRATORY SYNDROME CORONAVIRUS 2 (SARS-COV-2) (CORONAVIRUS DISEASE [COVID-19]), AMPLIFIED PROBE TECHNIQUE, MAKING USE OF HIGH THROUGHPUT TECHNOLOGIES AS DESCRIBED BY CMS-2020-01-R: HCPCS | Performed by: PHYSICIAN ASSISTANT

## 2020-08-16 PROCEDURE — 99202 OFFICE O/P NEW SF 15 MIN: CPT | Performed by: PHYSICIAN ASSISTANT

## 2020-08-16 NOTE — ED PROVIDER NOTES
Patient Seen in: [id number] [facility]      History   Patient presents with:  Diarrhea  Fatigue    Stated Complaint: diarrhea over 8 days,tired     HPI    41-year-old female presents to the clinic for evaluation of diarrhea for 8 days.   Patient supple. Cardiovascular:      Rate and Rhythm: Normal rate and regular rhythm. Pulmonary:      Effort: Pulmonary effort is normal.      Breath sounds: Normal breath sounds. Abdominal:      Palpations: Abdomen is soft. Tenderness:  There is no tend

## 2020-08-17 LAB — SARS-COV-2 RNA RESP QL NAA+PROBE: NOT DETECTED

## 2020-08-18 ENCOUNTER — APPOINTMENT (OUTPATIENT)
Dept: LAB | Age: 45
End: 2020-08-18
Attending: PHYSICIAN ASSISTANT
Payer: COMMERCIAL

## 2020-08-18 LAB — C DIFF TOX B STL QL: NEGATIVE

## 2020-08-18 PROCEDURE — 87046 STOOL CULTR AEROBIC BACT EA: CPT | Performed by: PHYSICIAN ASSISTANT

## 2020-08-18 PROCEDURE — 82272 OCCULT BLD FECES 1-3 TESTS: CPT | Performed by: PHYSICIAN ASSISTANT

## 2020-08-18 PROCEDURE — 87427 SHIGA-LIKE TOXIN AG IA: CPT | Performed by: PHYSICIAN ASSISTANT

## 2020-08-18 PROCEDURE — 87493 C DIFF AMPLIFIED PROBE: CPT | Performed by: PHYSICIAN ASSISTANT

## 2020-08-18 PROCEDURE — 87045 FECES CULTURE AEROBIC BACT: CPT | Performed by: PHYSICIAN ASSISTANT

## 2020-08-20 ENCOUNTER — TELEPHONE (OUTPATIENT)
Dept: INTERNAL MEDICINE CLINIC | Facility: CLINIC | Age: 45
End: 2020-08-20

## 2020-08-20 NOTE — TELEPHONE ENCOUNTER
Leslie Murphy, [address]: Appointment scheduled from [address] More Detail >>   [place]: Appointment scheduled from [address]:   1:54 PM   To: P [facility] Clinical Staff    Nanda Olivarez   MRN: [id number] : 1975   Pt W Background, [place] PÉREZ 4 hours ago (12:53 PM)         Appointment Information:     Provider:     REX Silva   Date:         8/21/2020   Time:          7:20 AM CDT   Dept:        [facility], [address] [place] [address] [address], Leila COFFMAN

## 2020-08-21 NOTE — TELEPHONE ENCOUNTER
Stool cultures reviewed. Continue BRAT diet. Push fluids. If persistent, may need to see GI. Likely viral in nature.

## 2020-08-21 NOTE — TELEPHONE ENCOUNTER
Patient states she went to UC on 8/16/20 after 9 days of loose stool, has been on the [employer] since then, now 3 soft stools daily, lighter brown in color, had one formed stool so she had mashed potatoes, meatloaf and loose stool came back today so back o

## 2020-09-04 NOTE — TELEPHONE ENCOUNTER
Spoke with patient. Patient advised to monitor s/s and if continuing GI referral. Patient states symptoms are improving at this time and will reach out if needed.  PHILLIP THOMPSON.

## 2020-10-20 ENCOUNTER — LAB ENCOUNTER (OUTPATIENT)
Dept: LAB | Age: 45
End: 2020-10-20
Attending: NURSE PRACTITIONER
Payer: COMMERCIAL

## 2020-10-20 DIAGNOSIS — Z13.1 SCREENING FOR DIABETES MELLITUS: ICD-10-CM

## 2020-10-20 DIAGNOSIS — Z13.0 SCREENING FOR BLOOD DISEASE: ICD-10-CM

## 2020-10-20 DIAGNOSIS — Z13.220 SCREENING, LIPID: ICD-10-CM

## 2020-10-20 DIAGNOSIS — Z13.29 SCREENING FOR THYROID DISORDER: ICD-10-CM

## 2020-10-20 PROCEDURE — 36415 COLL VENOUS BLD VENIPUNCTURE: CPT | Performed by: NURSE PRACTITIONER

## 2020-10-20 PROCEDURE — 80050 GENERAL HEALTH PANEL: CPT | Performed by: NURSE PRACTITIONER

## 2020-10-20 PROCEDURE — 80061 LIPID PANEL: CPT | Performed by: NURSE PRACTITIONER

## 2021-01-29 ENCOUNTER — PATIENT MESSAGE (OUTPATIENT)
Dept: INTERNAL MEDICINE CLINIC | Facility: CLINIC | Age: 46
End: 2021-01-29

## 2021-01-29 NOTE — TELEPHONE ENCOUNTER
From: Yariel Deng  To: REX Puente  Sent: 1/29/2021 12:36 PM CST  Subject: Test Results Question    My  does not have a PCP but had a recent blood screen at work. His numbers are very concerning.  I scheduled my  with Dr Siddharth Champagne

## 2021-08-24 NOTE — LETTER
10/4/2023  Gigi Hatchet  [address] [place] [address] Dr Ilsa Valle [id number]      Dear Gigi Hatchet,      My office staff has attempted to contact you at my request.  It is important for your health and well-being that you contact my office for the following reason(s):    _X__ Schedule office visit/office procedure    ___ Schedule laboratory/radiology testing    ___ Call to discuss results of testing and/or be advised of treatment changes      ___      Other:    I believe that the relationship between a physician and their patient is one of communication and mutual cooperation. It is important for the patient to follow through with the recommendations made by their Doctor in order to achieve the best possible outcome. Please contact our office at [phone].       Sincerely,    [facility], REX Oquendo Neurology Neurology Neurology Internal Medicine Neurology Internal Medicine Internal Medicine Internal Medicine Internal Medicine Internal Medicine

## 2021-09-06 ENCOUNTER — PATIENT MESSAGE (OUTPATIENT)
Dept: INTERNAL MEDICINE CLINIC | Facility: CLINIC | Age: 46
End: 2021-09-06

## 2021-09-06 DIAGNOSIS — Z12.31 ENCOUNTER FOR SCREENING MAMMOGRAM FOR MALIGNANT NEOPLASM OF BREAST: Primary | ICD-10-CM

## 2021-09-07 ENCOUNTER — TELEPHONE (OUTPATIENT)
Dept: INTERNAL MEDICINE CLINIC | Facility: CLINIC | Age: 46
End: 2021-09-07

## 2021-09-07 DIAGNOSIS — Z13.228 SCREENING FOR METABOLIC DISORDER: ICD-10-CM

## 2021-09-07 DIAGNOSIS — Z13.0 SCREENING FOR BLOOD DISEASE: ICD-10-CM

## 2021-09-07 DIAGNOSIS — Z13.29 SCREENING FOR THYROID DISORDER: ICD-10-CM

## 2021-09-07 DIAGNOSIS — Z00.00 ROUTINE GENERAL MEDICAL EXAMINATION AT A HEALTH CARE FACILITY: Primary | ICD-10-CM

## 2021-09-07 DIAGNOSIS — Z13.220 SCREENING FOR LIPID DISORDERS: ICD-10-CM

## 2021-09-07 NOTE — TELEPHONE ENCOUNTER
From: Nanda Olivarez  To: REX Perez  Sent: 9/6/2021 9:04 PM CDT  Subject: Referral Request    May I receive a referral to have mammogram done? I would like to complete it before our appointment on Nov 1st. Thank you!

## 2021-09-07 NOTE — TELEPHONE ENCOUNTER
CPE   Future Appointments   Date Time Provider Carmen Merchant   11/1/2021 10:00 AM REX El [facility]      Orders to   THE [facility]   aware must fast no call back required

## 2021-09-21 RX ORDER — FLUOXETINE HYDROCHLORIDE 40 MG/1
40 CAPSULE ORAL DAILY
Qty: 90 CAPSULE | Refills: 3 | Status: SHIPPED | OUTPATIENT
Start: 2021-09-21 | End: 2021-11-01

## 2021-09-21 NOTE — TELEPHONE ENCOUNTER
Last visit- 07/31/2020    Last refill- 07/31/2020 fluoxetine hcl 40mg QTY90 3R    Last labs- 10/20/2020 tsh, lipid, cmp, cbc  Future Appointments   Date Time Provider Carmen Merchant   9/25/2021 10:00 AM [facility]   11/1/2021 10:00

## 2021-09-25 ENCOUNTER — HOSPITAL ENCOUNTER (OUTPATIENT)
Dept: MAMMOGRAPHY | Facility: HOSPITAL | Age: 46
Discharge: HOME | End: 2021-09-25
Attending: INTERNAL MEDICINE
Payer: COMMERCIAL

## 2021-09-25 DIAGNOSIS — Z12.31 ENCOUNTER FOR SCREENING MAMMOGRAM FOR MALIGNANT NEOPLASM OF BREAST: ICD-10-CM

## 2021-09-25 PROCEDURE — 77067 SCR MAMMO BI INCL CAD: CPT | Performed by: INTERNAL MEDICINE

## 2021-09-25 PROCEDURE — 77063 BREAST TOMOSYNTHESIS BI: CPT | Performed by: INTERNAL MEDICINE

## 2021-11-01 ENCOUNTER — OFFICE VISIT (OUTPATIENT)
Dept: INTERNAL MEDICINE CLINIC | Facility: CLINIC | Age: 46
End: 2021-11-01
Payer: COMMERCIAL

## 2021-11-01 VITALS
DIASTOLIC BLOOD PRESSURE: 78 MMHG | BODY MASS INDEX: 35.68 KG/M2 | HEIGHT: 61 IN | HEART RATE: 90 BPM | WEIGHT: 189 LBS | SYSTOLIC BLOOD PRESSURE: 112 MMHG | TEMPERATURE: 100 F

## 2021-11-01 DIAGNOSIS — F41.1 ANXIETY STATE: ICD-10-CM

## 2021-11-01 DIAGNOSIS — Z12.11 SCREEN FOR COLON CANCER: ICD-10-CM

## 2021-11-01 DIAGNOSIS — G43.109 MIGRAINE WITH AURA AND WITHOUT STATUS MIGRAINOSUS, NOT INTRACTABLE: ICD-10-CM

## 2021-11-01 DIAGNOSIS — Z00.00 ROUTINE GENERAL MEDICAL EXAMINATION AT A HEALTH CARE FACILITY: Primary | ICD-10-CM

## 2021-11-01 PROCEDURE — 99396 PREV VISIT EST AGE 40-64: CPT | Performed by: NURSE PRACTITIONER

## 2021-11-01 PROCEDURE — 3078F DIAST BP <80 MM HG: CPT | Performed by: NURSE PRACTITIONER

## 2021-11-01 PROCEDURE — 3074F SYST BP LT 130 MM HG: CPT | Performed by: NURSE PRACTITIONER

## 2021-11-01 PROCEDURE — 3008F BODY MASS INDEX DOCD: CPT | Performed by: NURSE PRACTITIONER

## 2021-11-01 RX ORDER — FLUOXETINE HYDROCHLORIDE 20 MG/1
20 CAPSULE ORAL DAILY
Qty: 90 CAPSULE | Refills: 3 | Status: SHIPPED | OUTPATIENT
Start: 2021-11-01

## 2021-11-01 RX ORDER — ALPRAZOLAM 0.25 MG/1
0.25 TABLET ORAL DAILY PRN
Qty: 20 TABLET | Refills: 0 | Status: SHIPPED | OUTPATIENT
Start: 2021-11-01

## 2021-11-01 NOTE — PROGRESS NOTES
Clark Hitchcock is a 54year old female who presents for a complete physical exam:       Patient complains of:     Anxiety   She had weaned off as she was doing well. She is having more anxiety and depression recently mostly job related.    Special needs Diabetes Mother         type 2   • Depression Maternal Grandmother    • Alcohol and Other Disorders Associated Maternal Grandmother    • Depression Maternal Grandfather    • Alcohol and Other Disorders Associated Maternal Grandfather    • Alcohol and Other lesions  EYES:denies blurred vision or double vision  HEENT: denies nasal congestion, sinus pain or ST  LUNGS: denies shortness of breath with exertion  CARDIOVASCULAR: denies chest pain on exertion  GI: denies abdominal pain,denies heartburn  : denies d mouth daily. • ALPRAZolam (XANAX) 0.25 MG Oral Tab 20 tablet 0     Sig: Take 1 tablet (0.25 mg total) by mouth daily as needed for Anxiety. Imaging & Consults:  EVALUATE & TREAT, GASTRO (INTERNAL)    No follow-ups on file.   There are no Patient Ins

## 2022-07-02 ENCOUNTER — LAB ENCOUNTER (OUTPATIENT)
Dept: LAB | Age: 47
End: 2022-07-02
Attending: NURSE PRACTITIONER
Payer: COMMERCIAL

## 2022-07-02 DIAGNOSIS — Z13.0 SCREENING FOR BLOOD DISEASE: ICD-10-CM

## 2022-07-02 DIAGNOSIS — Z13.228 SCREENING FOR METABOLIC DISORDER: ICD-10-CM

## 2022-07-02 DIAGNOSIS — Z13.220 SCREENING FOR LIPID DISORDERS: ICD-10-CM

## 2022-07-02 DIAGNOSIS — Z13.29 SCREENING FOR THYROID DISORDER: ICD-10-CM

## 2022-07-02 DIAGNOSIS — Z00.00 ROUTINE GENERAL MEDICAL EXAMINATION AT A HEALTH CARE FACILITY: ICD-10-CM

## 2022-07-02 LAB
ALBUMIN SERPL-MCNC: 3.8 G/DL (ref 3.4–5)
ALBUMIN/GLOB SERPL: 1 {RATIO} (ref 1–2)
ALP LIVER SERPL-CCNC: 62 U/L
ALT SERPL-CCNC: 22 U/L
ANION GAP SERPL CALC-SCNC: 4 MMOL/L (ref 0–18)
AST SERPL-CCNC: 20 U/L (ref 15–37)
BASOPHILS # BLD AUTO: 0.05 X10(3) UL (ref 0–0.2)
BASOPHILS NFR BLD AUTO: 0.9 %
BILIRUB SERPL-MCNC: 0.4 MG/DL (ref 0.1–2)
BUN BLD-MCNC: 19 MG/DL (ref 7–18)
CALCIUM BLD-MCNC: 9.2 MG/DL (ref 8.5–10.1)
CHLORIDE SERPL-SCNC: 108 MMOL/L (ref 98–112)
CHOLEST SERPL-MCNC: 196 MG/DL (ref ?–200)
CO2 SERPL-SCNC: 28 MMOL/L (ref 21–32)
CREAT BLD-MCNC: 1.01 MG/DL
EOSINOPHIL # BLD AUTO: 0.08 X10(3) UL (ref 0–0.7)
EOSINOPHIL NFR BLD AUTO: 1.4 %
ERYTHROCYTE [DISTWIDTH] IN BLOOD BY AUTOMATED COUNT: 13.6 %
FASTING PATIENT LIPID ANSWER: YES
FASTING STATUS PATIENT QL REPORTED: YES
GLOBULIN PLAS-MCNC: 4 G/DL (ref 2.8–4.4)
GLUCOSE BLD-MCNC: 95 MG/DL (ref 70–99)
HCT VFR BLD AUTO: 40.7 %
HDLC SERPL-MCNC: 56 MG/DL (ref 40–59)
HGB BLD-MCNC: 13.6 G/DL
IMM GRANULOCYTES # BLD AUTO: 0.01 X10(3) UL (ref 0–1)
IMM GRANULOCYTES NFR BLD: 0.2 %
LDLC SERPL CALC-MCNC: 121 MG/DL (ref ?–100)
LYMPHOCYTES # BLD AUTO: 1.79 X10(3) UL (ref 1–4)
LYMPHOCYTES NFR BLD AUTO: 31.7 %
MCH RBC QN AUTO: 32 PG (ref 26–34)
MCHC RBC AUTO-ENTMCNC: 33.4 G/DL (ref 31–37)
MCV RBC AUTO: 95.8 FL
MONOCYTES # BLD AUTO: 0.43 X10(3) UL (ref 0.1–1)
MONOCYTES NFR BLD AUTO: 7.6 %
NEUTROPHILS # BLD AUTO: 3.29 X10 (3) UL (ref 1.5–7.7)
NEUTROPHILS # BLD AUTO: 3.29 X10(3) UL (ref 1.5–7.7)
NEUTROPHILS NFR BLD AUTO: 58.2 %
NONHDLC SERPL-MCNC: 140 MG/DL (ref ?–130)
OSMOLALITY SERPL CALC.SUM OF ELEC: 292 MOSM/KG (ref 275–295)
PLATELET # BLD AUTO: 250 10(3)UL (ref 150–450)
POTASSIUM SERPL-SCNC: 4.4 MMOL/L (ref 3.5–5.1)
PROT SERPL-MCNC: 7.8 G/DL (ref 6.4–8.2)
RBC # BLD AUTO: 4.25 X10(6)UL
SODIUM SERPL-SCNC: 140 MMOL/L (ref 136–145)
TRIGL SERPL-MCNC: 109 MG/DL (ref 30–149)
TSI SER-ACNC: 1.85 MIU/ML (ref 0.36–3.74)
VLDLC SERPL CALC-MCNC: 19 MG/DL (ref 0–30)
WBC # BLD AUTO: 5.7 X10(3) UL (ref 4–11)

## 2022-07-02 PROCEDURE — 85025 COMPLETE CBC W/AUTO DIFF WBC: CPT

## 2022-07-02 PROCEDURE — 84443 ASSAY THYROID STIM HORMONE: CPT

## 2022-07-02 PROCEDURE — 80061 LIPID PANEL: CPT

## 2022-07-02 PROCEDURE — 80053 COMPREHEN METABOLIC PANEL: CPT

## 2022-07-02 PROCEDURE — 36415 COLL VENOUS BLD VENIPUNCTURE: CPT

## 2022-07-11 ENCOUNTER — OFFICE VISIT (OUTPATIENT)
Dept: INTERNAL MEDICINE CLINIC | Facility: CLINIC | Age: 47
End: 2022-07-11
Payer: COMMERCIAL

## 2022-07-11 VITALS
TEMPERATURE: 98 F | DIASTOLIC BLOOD PRESSURE: 62 MMHG | HEIGHT: 61 IN | HEART RATE: 90 BPM | WEIGHT: 190 LBS | SYSTOLIC BLOOD PRESSURE: 106 MMHG | OXYGEN SATURATION: 99 % | BODY MASS INDEX: 35.87 KG/M2

## 2022-07-11 DIAGNOSIS — N92.1 MENORRHAGIA WITH IRREGULAR CYCLE: Primary | ICD-10-CM

## 2022-07-11 DIAGNOSIS — Z12.11 SCREENING FOR MALIGNANT NEOPLASM OF COLON: ICD-10-CM

## 2022-07-11 DIAGNOSIS — Z12.31 ENCOUNTER FOR SCREENING MAMMOGRAM FOR MALIGNANT NEOPLASM OF BREAST: ICD-10-CM

## 2022-07-11 DIAGNOSIS — Z80.49 FAMILY HISTORY OF UTERINE CANCER: ICD-10-CM

## 2022-07-11 PROCEDURE — 3008F BODY MASS INDEX DOCD: CPT | Performed by: NURSE PRACTITIONER

## 2022-07-11 PROCEDURE — 3074F SYST BP LT 130 MM HG: CPT | Performed by: NURSE PRACTITIONER

## 2022-07-11 PROCEDURE — 99213 OFFICE O/P EST LOW 20 MIN: CPT | Performed by: NURSE PRACTITIONER

## 2022-07-11 PROCEDURE — 3078F DIAST BP <80 MM HG: CPT | Performed by: NURSE PRACTITIONER

## 2022-09-21 ENCOUNTER — TELEPHONE (OUTPATIENT)
Dept: INTERNAL MEDICINE CLINIC | Facility: CLINIC | Age: 47
End: 2022-09-21

## 2022-09-21 NOTE — TELEPHONE ENCOUNTER
Pt scheduled the below appt through [employer].  High TE    Lightheadedness and symptoms, 3 weeks after hospitalization for COVID    Future Appointments   Date Time Provider Carmen Shonda   9/26/2022  2:20 PM REX Parks [facility]

## 2022-09-21 NOTE — TELEPHONE ENCOUNTER
FYI unless diff    Pt reports near the end of August, she contracted covid. She went to the ED on 9/1 due to syncope and hallucinations during the viral infection. Was admitted for a couple nights. States wants to come in for a f/u because she continues to have (x several weeks) some occasional lightheadedness, tiredness, and ocassional dry cough. States she's not really worried about the cough, she feels it is expected- but wants to address the other. Denies cp, sob, n/v, visual disturbance, headache, or weakness. ED warnings given.

## 2022-09-26 ENCOUNTER — OFFICE VISIT (OUTPATIENT)
Dept: INTERNAL MEDICINE CLINIC | Facility: CLINIC | Age: 47
End: 2022-09-26

## 2022-09-26 VITALS
SYSTOLIC BLOOD PRESSURE: 124 MMHG | HEART RATE: 70 BPM | WEIGHT: 194.19 LBS | TEMPERATURE: 98 F | HEIGHT: 61 IN | RESPIRATION RATE: 12 BRPM | OXYGEN SATURATION: 98 % | DIASTOLIC BLOOD PRESSURE: 62 MMHG | BODY MASS INDEX: 36.66 KG/M2

## 2022-09-26 DIAGNOSIS — U07.1 COVID-19 VIRUS INFECTION: Primary | ICD-10-CM

## 2022-09-26 DIAGNOSIS — D72.819 LEUKOPENIA, UNSPECIFIED TYPE: ICD-10-CM

## 2022-09-26 DIAGNOSIS — F41.1 ANXIETY STATE: ICD-10-CM

## 2022-09-26 DIAGNOSIS — R55 SYNCOPE, UNSPECIFIED SYNCOPE TYPE: ICD-10-CM

## 2022-09-26 PROCEDURE — 3008F BODY MASS INDEX DOCD: CPT | Performed by: NURSE PRACTITIONER

## 2022-09-26 PROCEDURE — 3074F SYST BP LT 130 MM HG: CPT | Performed by: NURSE PRACTITIONER

## 2022-09-26 PROCEDURE — 3078F DIAST BP <80 MM HG: CPT | Performed by: NURSE PRACTITIONER

## 2022-09-26 PROCEDURE — 99214 OFFICE O/P EST MOD 30 MIN: CPT | Performed by: NURSE PRACTITIONER

## 2022-09-26 RX ORDER — FLUTICASONE PROPIONATE 50 MCG
1 SPRAY, SUSPENSION (ML) NASAL 2 TIMES DAILY
Qty: 3 EACH | Refills: 0 | Status: SHIPPED | OUTPATIENT
Start: 2022-09-26

## 2022-09-26 NOTE — PROGRESS NOTES
CPE - Due 11/1/2022 - Needs Appointment  PAP - UTD until 9/11/2023  MAMMOGRAM - Due Ordered 7/11/2022  COLONOSCOPY - Due Referral Pended

## 2023-02-16 RX ORDER — FLUOXETINE HYDROCHLORIDE 20 MG/1
CAPSULE ORAL
Qty: 90 CAPSULE | Refills: 0 | Status: SHIPPED | OUTPATIENT
Start: 2023-02-16

## 2023-06-21 RX ORDER — FLUOXETINE HYDROCHLORIDE 20 MG/1
CAPSULE ORAL
Qty: 90 CAPSULE | Refills: 0 | Status: SHIPPED | OUTPATIENT
Start: 2023-06-21

## 2023-06-21 NOTE — TELEPHONE ENCOUNTER
Last office visit 9/26/23 - hospital follow up     No future visits scheduled       Anxiety state  Stable  Cont prozac. PRN rare use of xanax.

## 2023-08-07 ENCOUNTER — HOSPITAL ENCOUNTER (OUTPATIENT)
Age: 48
Discharge: HOME | End: 2023-08-07
Payer: COMMERCIAL

## 2023-08-07 ENCOUNTER — APPOINTMENT (OUTPATIENT)
Dept: GENERAL RADIOLOGY | Facility: HOSPITAL | Age: 48
End: 2023-08-07
Attending: EMERGENCY MEDICINE
Payer: COMMERCIAL

## 2023-08-07 ENCOUNTER — HOSPITAL ENCOUNTER (EMERGENCY)
Facility: HOSPITAL | Age: 48
Discharge: HOME | End: 2023-08-07
Attending: EMERGENCY MEDICINE
Payer: COMMERCIAL

## 2023-08-07 VITALS
DIASTOLIC BLOOD PRESSURE: 62 MMHG | OXYGEN SATURATION: 97 % | WEIGHT: 190 LBS | HEART RATE: 97 BPM | TEMPERATURE: 99 F | SYSTOLIC BLOOD PRESSURE: 118 MMHG | BODY MASS INDEX: 35.87 KG/M2 | RESPIRATION RATE: 16 BRPM | HEIGHT: 61 IN

## 2023-08-07 VITALS
OXYGEN SATURATION: 100 % | BODY MASS INDEX: 35.87 KG/M2 | WEIGHT: 190 LBS | RESPIRATION RATE: 20 BRPM | TEMPERATURE: 98 F | HEART RATE: 89 BPM | DIASTOLIC BLOOD PRESSURE: 74 MMHG | SYSTOLIC BLOOD PRESSURE: 99 MMHG | HEIGHT: 61 IN

## 2023-08-07 DIAGNOSIS — M54.31 RIGHT SCIATIC NERVE PAIN: Primary | ICD-10-CM

## 2023-08-07 DIAGNOSIS — M54.40 BACK PAIN OF LUMBAR REGION WITH SCIATICA: Primary | ICD-10-CM

## 2023-08-07 PROCEDURE — 96372 THER/PROPH/DIAG INJ SC/IM: CPT

## 2023-08-07 PROCEDURE — 99213 OFFICE O/P EST LOW 20 MIN: CPT | Performed by: NURSE PRACTITIONER

## 2023-08-07 PROCEDURE — 99284 EMERGENCY DEPT VISIT MOD MDM: CPT

## 2023-08-07 PROCEDURE — 72110 X-RAY EXAM L-2 SPINE 4/>VWS: CPT | Performed by: EMERGENCY MEDICINE

## 2023-08-07 PROCEDURE — 99283 EMERGENCY DEPT VISIT LOW MDM: CPT

## 2023-08-07 RX ORDER — PREDNISONE 20 MG/1
40 TABLET ORAL DAILY
Qty: 10 TABLET | Refills: 0 | Status: SHIPPED | OUTPATIENT
Start: 2023-08-07 | End: 2023-08-12

## 2023-08-07 RX ORDER — HYDROMORPHONE HYDROCHLORIDE 1 MG/ML
1 INJECTION, SOLUTION INTRAMUSCULAR; INTRAVENOUS; SUBCUTANEOUS ONCE
Status: COMPLETED | OUTPATIENT
Start: 2023-08-07 | End: 2023-08-07

## 2023-08-07 RX ORDER — HYDROCODONE BITARTRATE AND ACETAMINOPHEN 5; 325 MG/1; MG/1
1-2 TABLET ORAL EVERY 4 HOURS PRN
Qty: 20 TABLET | Refills: 0 | Status: SHIPPED | OUTPATIENT
Start: 2023-08-07

## 2023-08-07 RX ORDER — CYCLOBENZAPRINE HCL 10 MG
10 TABLET ORAL 3 TIMES DAILY PRN
Qty: 20 TABLET | Refills: 0 | Status: SHIPPED | OUTPATIENT
Start: 2023-08-07 | End: 2023-08-14

## 2023-08-07 RX ORDER — NAPROXEN 500 MG/1
500 TABLET ORAL 2 TIMES DAILY PRN
Qty: 20 TABLET | Refills: 0 | Status: SHIPPED | OUTPATIENT
Start: 2023-08-07 | End: 2023-08-14

## 2023-08-07 NOTE — ED INITIAL ASSESSMENT (HPI)
Pt with c/o sciatica pain for the last few days. Pt states woke up this morning with back spasms. Pt tried icing and stretching. Took 2 aleve at [employer].   Pain continues

## 2023-08-07 NOTE — ED INITIAL ASSESSMENT (HPI)
Worsening sciatica pain since this am, seen at  and took aleve before, given flexeril and oral steroid at  around 10 am, pt had no releif from meds

## 2023-08-08 ENCOUNTER — TELEPHONE (OUTPATIENT)
Dept: ORTHOPEDICS CLINIC | Facility: CLINIC | Age: 48
End: 2023-08-08

## 2023-08-08 NOTE — TELEPHONE ENCOUNTER
Alaina Díaz, REX sent to [facility]  Can we offer this patient a follow up visit in the ortho-spine office? Dr. Ant Badillo or myself.     Thank you    [employer]

## 2023-09-26 RX ORDER — FLUOXETINE HYDROCHLORIDE 20 MG/1
CAPSULE ORAL
Qty: 90 CAPSULE | Refills: 0 | Status: SHIPPED | OUTPATIENT
Start: 2023-09-26

## 2023-09-26 NOTE — TELEPHONE ENCOUNTER
Last annual on file 11/1/2021    Has had few visits for problems since    Please advise on refills.      No future visits scheduled    PSR's please reach out to schedule annual

## 2023-10-18 ENCOUNTER — TELEPHONE (OUTPATIENT)
Dept: INTERNAL MEDICINE CLINIC | Facility: CLINIC | Age: 48
End: 2023-10-18

## 2023-10-18 DIAGNOSIS — Z13.220 SCREENING FOR LIPID DISORDERS: ICD-10-CM

## 2023-10-18 DIAGNOSIS — Z00.00 ROUTINE GENERAL MEDICAL EXAMINATION AT A HEALTH CARE FACILITY: Primary | ICD-10-CM

## 2023-10-18 DIAGNOSIS — Z13.29 SCREENING FOR THYROID DISORDER: ICD-10-CM

## 2023-10-18 DIAGNOSIS — Z13.0 SCREENING FOR DISORDER OF BLOOD AND BLOOD-FORMING ORGANS: ICD-10-CM

## 2023-10-18 DIAGNOSIS — Z13.228 SCREENING FOR METABOLIC DISORDER: ICD-10-CM

## 2023-10-18 NOTE — TELEPHONE ENCOUNTER
Orders to      [facility]         Pt aware to get labs done no sooner than 2 weeks prior to the appt. Pt aware to fast.  No call back required.   Future Appointments   Date Time Provider Carmen Merchant   11/9/2023  1:40 PM REX Nix [facility]

## 2023-10-19 NOTE — TELEPHONE ENCOUNTER
Future Appointments   Date Time Provider Carmen Shonda   11/9/2023  1:40 PM REX Waldron [facility]

## 2023-10-30 ENCOUNTER — LAB ENCOUNTER (OUTPATIENT)
Dept: LAB | Age: 48
End: 2023-10-30
Attending: NURSE PRACTITIONER
Payer: COMMERCIAL

## 2023-10-30 DIAGNOSIS — Z00.00 ROUTINE GENERAL MEDICAL EXAMINATION AT A HEALTH CARE FACILITY: ICD-10-CM

## 2023-10-30 DIAGNOSIS — Z13.228 SCREENING FOR METABOLIC DISORDER: ICD-10-CM

## 2023-10-30 DIAGNOSIS — Z13.220 SCREENING FOR LIPID DISORDERS: ICD-10-CM

## 2023-10-30 DIAGNOSIS — Z13.0 SCREENING FOR DISORDER OF BLOOD AND BLOOD-FORMING ORGANS: ICD-10-CM

## 2023-10-30 DIAGNOSIS — Z13.29 SCREENING FOR THYROID DISORDER: ICD-10-CM

## 2023-10-30 LAB
ALBUMIN SERPL-MCNC: 3.7 G/DL (ref 3.4–5)
ALBUMIN/GLOB SERPL: 1 {RATIO} (ref 1–2)
ALP LIVER SERPL-CCNC: 53 U/L
ALT SERPL-CCNC: 26 U/L
ANION GAP SERPL CALC-SCNC: 4 MMOL/L (ref 0–18)
AST SERPL-CCNC: 14 U/L (ref 15–37)
BASOPHILS # BLD AUTO: 0.05 X10(3) UL (ref 0–0.2)
BASOPHILS NFR BLD AUTO: 0.8 %
BILIRUB SERPL-MCNC: 0.5 MG/DL (ref 0.1–2)
BUN BLD-MCNC: 19 MG/DL (ref 7–18)
CALCIUM BLD-MCNC: 8.8 MG/DL (ref 8.5–10.1)
CHLORIDE SERPL-SCNC: 105 MMOL/L (ref 98–112)
CHOLEST SERPL-MCNC: 183 MG/DL (ref ?–200)
CO2 SERPL-SCNC: 27 MMOL/L (ref 21–32)
CREAT BLD-MCNC: 1.02 MG/DL
EGFRCR SERPLBLD CKD-EPI 2021: 68 ML/MIN/1.73M2 (ref 60–?)
EOSINOPHIL # BLD AUTO: 0.2 X10(3) UL (ref 0–0.7)
EOSINOPHIL NFR BLD AUTO: 3.2 %
ERYTHROCYTE [DISTWIDTH] IN BLOOD BY AUTOMATED COUNT: 13.7 %
FASTING PATIENT LIPID ANSWER: YES
FASTING STATUS PATIENT QL REPORTED: YES
GLOBULIN PLAS-MCNC: 3.7 G/DL (ref 2.8–4.4)
GLUCOSE BLD-MCNC: 91 MG/DL (ref 70–99)
HCT VFR BLD AUTO: 42.9 %
HDLC SERPL-MCNC: 65 MG/DL (ref 40–59)
HGB BLD-MCNC: 14.3 G/DL
IMM GRANULOCYTES # BLD AUTO: 0.02 X10(3) UL (ref 0–1)
IMM GRANULOCYTES NFR BLD: 0.3 %
LDLC SERPL CALC-MCNC: 98 MG/DL (ref ?–100)
LYMPHOCYTES # BLD AUTO: 1.49 X10(3) UL (ref 1–4)
LYMPHOCYTES NFR BLD AUTO: 23.7 %
MCH RBC QN AUTO: 32.3 PG (ref 26–34)
MCHC RBC AUTO-ENTMCNC: 33.3 G/DL (ref 31–37)
MCV RBC AUTO: 96.8 FL
MONOCYTES # BLD AUTO: 0.5 X10(3) UL (ref 0.1–1)
MONOCYTES NFR BLD AUTO: 7.9 %
NEUTROPHILS # BLD AUTO: 4.03 X10 (3) UL (ref 1.5–7.7)
NEUTROPHILS # BLD AUTO: 4.03 X10(3) UL (ref 1.5–7.7)
NEUTROPHILS NFR BLD AUTO: 64.1 %
NONHDLC SERPL-MCNC: 118 MG/DL (ref ?–130)
OSMOLALITY SERPL CALC.SUM OF ELEC: 284 MOSM/KG (ref 275–295)
PLATELET # BLD AUTO: 266 10(3)UL (ref 150–450)
POTASSIUM SERPL-SCNC: 4.2 MMOL/L (ref 3.5–5.1)
PROT SERPL-MCNC: 7.4 G/DL (ref 6.4–8.2)
RBC # BLD AUTO: 4.43 X10(6)UL
SODIUM SERPL-SCNC: 136 MMOL/L (ref 136–145)
TRIGL SERPL-MCNC: 112 MG/DL (ref 30–149)
TSI SER-ACNC: 1.84 MIU/ML (ref 0.36–3.74)
VLDLC SERPL CALC-MCNC: 18 MG/DL (ref 0–30)
WBC # BLD AUTO: 6.3 X10(3) UL (ref 4–11)

## 2023-10-30 PROCEDURE — 80053 COMPREHEN METABOLIC PANEL: CPT

## 2023-10-30 PROCEDURE — 84443 ASSAY THYROID STIM HORMONE: CPT

## 2023-10-30 PROCEDURE — 85025 COMPLETE CBC W/AUTO DIFF WBC: CPT

## 2023-10-30 PROCEDURE — 36415 COLL VENOUS BLD VENIPUNCTURE: CPT

## 2023-10-30 PROCEDURE — 80061 LIPID PANEL: CPT

## 2023-11-09 ENCOUNTER — OFFICE VISIT (OUTPATIENT)
Dept: INTERNAL MEDICINE CLINIC | Facility: CLINIC | Age: 48
End: 2023-11-09
Payer: COMMERCIAL

## 2023-11-09 VITALS
OXYGEN SATURATION: 96 % | DIASTOLIC BLOOD PRESSURE: 74 MMHG | SYSTOLIC BLOOD PRESSURE: 122 MMHG | HEART RATE: 82 BPM | BODY MASS INDEX: 37.57 KG/M2 | WEIGHT: 199 LBS | TEMPERATURE: 97 F | RESPIRATION RATE: 17 BRPM | HEIGHT: 61 IN

## 2023-11-09 DIAGNOSIS — Z12.4 SCREENING FOR CERVICAL CANCER: ICD-10-CM

## 2023-11-09 DIAGNOSIS — G43.109 MIGRAINE WITH AURA AND WITHOUT STATUS MIGRAINOSUS, NOT INTRACTABLE: ICD-10-CM

## 2023-11-09 DIAGNOSIS — N92.1 MENORRHAGIA WITH IRREGULAR CYCLE: ICD-10-CM

## 2023-11-09 DIAGNOSIS — Z12.31 ENCOUNTER FOR SCREENING MAMMOGRAM FOR MALIGNANT NEOPLASM OF BREAST: ICD-10-CM

## 2023-11-09 DIAGNOSIS — Z00.00 ROUTINE GENERAL MEDICAL EXAMINATION AT A HEALTH CARE FACILITY: Primary | ICD-10-CM

## 2023-11-09 DIAGNOSIS — F41.9 ANXIETY: ICD-10-CM

## 2023-11-09 DIAGNOSIS — Z11.51 SCREENING FOR HUMAN PAPILLOMAVIRUS (HPV): ICD-10-CM

## 2023-11-09 DIAGNOSIS — Z12.11 SCREEN FOR COLON CANCER: ICD-10-CM

## 2023-11-09 PROCEDURE — 99396 PREV VISIT EST AGE 40-64: CPT | Performed by: NURSE PRACTITIONER

## 2023-11-09 PROCEDURE — 87624 HPV HI-RISK TYP POOLED RSLT: CPT | Performed by: NURSE PRACTITIONER

## 2023-11-09 PROCEDURE — 90686 IIV4 VACC NO PRSV 0.5 ML IM: CPT | Performed by: NURSE PRACTITIONER

## 2023-11-09 PROCEDURE — 90471 IMMUNIZATION ADMIN: CPT | Performed by: NURSE PRACTITIONER

## 2023-11-09 PROCEDURE — 3078F DIAST BP <80 MM HG: CPT | Performed by: NURSE PRACTITIONER

## 2023-11-09 PROCEDURE — 3008F BODY MASS INDEX DOCD: CPT | Performed by: NURSE PRACTITIONER

## 2023-11-09 PROCEDURE — 3074F SYST BP LT 130 MM HG: CPT | Performed by: NURSE PRACTITIONER

## 2023-11-09 PROCEDURE — 88175 CYTOPATH C/V AUTO FLUID REDO: CPT | Performed by: NURSE PRACTITIONER

## 2023-11-09 RX ORDER — FLUOXETINE HYDROCHLORIDE 20 MG/1
20 CAPSULE ORAL DAILY
Qty: 90 CAPSULE | Refills: 3 | Status: SHIPPED | OUTPATIENT
Start: 2023-11-09

## 2023-11-09 RX ORDER — NAPROXEN 500 MG/1
500 TABLET ORAL 2 TIMES DAILY PRN
Qty: 60 TABLET | Refills: 0 | Status: SHIPPED | OUTPATIENT
Start: 2023-11-09

## 2023-11-09 RX ORDER — ALPRAZOLAM 0.25 MG/1
0.25 TABLET ORAL DAILY PRN
Qty: 20 TABLET | Refills: 0 | Status: SHIPPED | OUTPATIENT
Start: 2023-11-09

## 2023-11-10 LAB — HPV I/H RISK 1 DNA SPEC QL NAA+PROBE: NEGATIVE

## 2023-11-15 LAB
.: NORMAL
.: NORMAL

## 2024-01-22 ENCOUNTER — HOSPITAL ENCOUNTER (OUTPATIENT)
Dept: MAMMOGRAPHY | Facility: HOSPITAL | Age: 49
Discharge: HOME | End: 2024-01-22
Attending: NURSE PRACTITIONER
Payer: COMMERCIAL

## 2024-01-22 DIAGNOSIS — Z12.31 ENCOUNTER FOR SCREENING MAMMOGRAM FOR MALIGNANT NEOPLASM OF BREAST: ICD-10-CM

## 2024-01-22 DIAGNOSIS — R92.2 INCONCLUSIVE MAMMOGRAM: Primary | ICD-10-CM

## 2024-01-22 PROCEDURE — 77063 BREAST TOMOSYNTHESIS BI: CPT | Performed by: NURSE PRACTITIONER

## 2024-01-22 PROCEDURE — 77067 SCR MAMMO BI INCL CAD: CPT | Performed by: NURSE PRACTITIONER

## 2024-02-05 ENCOUNTER — HOSPITAL ENCOUNTER (OUTPATIENT)
Dept: MAMMOGRAPHY | Facility: HOSPITAL | Age: 49
Discharge: HOME | End: 2024-02-05
Attending: NURSE PRACTITIONER
Payer: COMMERCIAL

## 2024-02-05 DIAGNOSIS — R92.2 INCONCLUSIVE MAMMOGRAM: ICD-10-CM

## 2024-02-05 PROCEDURE — 76642 ULTRASOUND BREAST LIMITED: CPT | Performed by: NURSE PRACTITIONER

## 2024-02-05 PROCEDURE — 77061 BREAST TOMOSYNTHESIS UNI: CPT | Performed by: NURSE PRACTITIONER

## 2024-02-05 PROCEDURE — 77065 DX MAMMO INCL CAD UNI: CPT | Performed by: NURSE PRACTITIONER

## 2024-06-24 RX ORDER — ALPRAZOLAM 0.25 MG/1
0.25 TABLET ORAL DAILY PRN
Qty: 20 TABLET | Refills: 0 | OUTPATIENT
Start: 2024-06-24

## 2024-09-30 ENCOUNTER — PATIENT MESSAGE (OUTPATIENT)
Dept: INTERNAL MEDICINE CLINIC | Facility: CLINIC | Age: 49
End: 2024-09-30

## 2024-10-02 NOTE — TELEPHONE ENCOUNTER
I am not able to complete without ov.  Please have patient drop forms off at the office to see what needs to be completed as well and set up first available appt.  Unfortunately, this may not meet her needs.
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
## 2024-10-15 ENCOUNTER — TELEPHONE (OUTPATIENT)
Dept: INTERNAL MEDICINE CLINIC | Facility: CLINIC | Age: 49
End: 2024-10-15

## 2024-10-15 NOTE — TELEPHONE ENCOUNTER
Patient made the below appt through [employer].  Not sure if he needs to be triaged.  High TE    Appointment for: Halina Zhao ([id number])Visit type: [facility] FOLLOW UP ([id number])10/16/2024 1:00 PM (30 minutes) with Alyce Love in [facility] [address] comments:Return of PTSD----------------------------------      This appointment rescheduled from:10/23/2024 8:30 AM (30 minutes) with Alyce Love in [facility] [address]

## 2024-10-18 ENCOUNTER — PATIENT MESSAGE (OUTPATIENT)
Dept: INTERNAL MEDICINE CLINIC | Facility: CLINIC | Age: 49
End: 2024-10-18

## 2024-10-20 ENCOUNTER — TELEPHONE (OUTPATIENT)
Age: 49
End: 2024-10-20

## 2024-10-20 NOTE — TELEPHONE ENCOUNTER
Hello,     The [facility] Navigation team has attempted to reach you regarding an order from Caity Love's office. We are reaching out in order to assist you in coordinating care and resources that may meet your needs. Please give our office a call at [phone]. For more immediate assistance you can contact our 24-hour help line at [phone]. We look forward to hearing from you soon.

## 2024-10-23 ENCOUNTER — TELEPHONE (OUTPATIENT)
Dept: INTERNAL MEDICINE CLINIC | Facility: CLINIC | Age: 49
End: 2024-10-23

## 2024-10-23 ENCOUNTER — TELEPHONE (OUTPATIENT)
Age: 49
End: 2024-10-23

## 2024-10-23 NOTE — TELEPHONE ENCOUNTER
Received medical record request from [facility].    Ph. [phone]  Fax: [phone]    Sent to Leonela and [employer].

## 2024-11-04 ENCOUNTER — LAB ENCOUNTER (OUTPATIENT)
Dept: LAB | Age: 49
End: 2024-11-04
Attending: NURSE PRACTITIONER
Payer: COMMERCIAL

## 2024-11-04 DIAGNOSIS — Z13.1 SCREENING FOR DIABETES MELLITUS: ICD-10-CM

## 2024-11-04 DIAGNOSIS — Z13.220 SCREENING, LIPID: ICD-10-CM

## 2024-11-04 DIAGNOSIS — Z13.29 SCREENING FOR THYROID DISORDER: ICD-10-CM

## 2024-11-04 DIAGNOSIS — Z13.0 SCREENING FOR BLOOD DISEASE: ICD-10-CM

## 2024-11-04 LAB
ALBUMIN SERPL-MCNC: 4.3 G/DL (ref 3.2–4.8)
ALBUMIN/GLOB SERPL: 1.3 {RATIO} (ref 1–2)
ALP LIVER SERPL-CCNC: 66 U/L
ALT SERPL-CCNC: 37 U/L
ANION GAP SERPL CALC-SCNC: 4 MMOL/L (ref 0–18)
AST SERPL-CCNC: 31 U/L (ref ?–34)
BASOPHILS # BLD AUTO: 0.06 X10(3) UL (ref 0–0.2)
BASOPHILS NFR BLD AUTO: 1.1 %
BILIRUB SERPL-MCNC: 0.6 MG/DL (ref 0.3–1.2)
BUN BLD-MCNC: 19 MG/DL (ref 9–23)
CALCIUM BLD-MCNC: 10.3 MG/DL (ref 8.7–10.4)
CHLORIDE SERPL-SCNC: 104 MMOL/L (ref 98–112)
CHOLEST SERPL-MCNC: 207 MG/DL (ref ?–200)
CO2 SERPL-SCNC: 26 MMOL/L (ref 21–32)
CREAT BLD-MCNC: 0.95 MG/DL
EGFRCR SERPLBLD CKD-EPI 2021: 73 ML/MIN/1.73M2 (ref 60–?)
EOSINOPHIL # BLD AUTO: 0.06 X10(3) UL (ref 0–0.7)
EOSINOPHIL NFR BLD AUTO: 1.1 %
ERYTHROCYTE [DISTWIDTH] IN BLOOD BY AUTOMATED COUNT: 13.7 %
FASTING PATIENT LIPID ANSWER: YES
FASTING STATUS PATIENT QL REPORTED: YES
GLOBULIN PLAS-MCNC: 3.3 G/DL (ref 2–3.5)
GLUCOSE BLD-MCNC: 78 MG/DL (ref 70–99)
HCT VFR BLD AUTO: 43.3 %
HDLC SERPL-MCNC: 55 MG/DL (ref 40–59)
HGB BLD-MCNC: 14.4 G/DL
IMM GRANULOCYTES # BLD AUTO: 0.01 X10(3) UL (ref 0–1)
IMM GRANULOCYTES NFR BLD: 0.2 %
LDLC SERPL CALC-MCNC: 134 MG/DL (ref ?–100)
LYMPHOCYTES # BLD AUTO: 1.43 X10(3) UL (ref 1–4)
LYMPHOCYTES NFR BLD AUTO: 26.7 %
MCH RBC QN AUTO: 31.9 PG (ref 26–34)
MCHC RBC AUTO-ENTMCNC: 33.3 G/DL (ref 31–37)
MCV RBC AUTO: 96 FL
MONOCYTES # BLD AUTO: 0.54 X10(3) UL (ref 0.1–1)
MONOCYTES NFR BLD AUTO: 10.1 %
NEUTROPHILS # BLD AUTO: 3.26 X10 (3) UL (ref 1.5–7.7)
NEUTROPHILS # BLD AUTO: 3.26 X10(3) UL (ref 1.5–7.7)
NEUTROPHILS NFR BLD AUTO: 60.8 %
NONHDLC SERPL-MCNC: 152 MG/DL (ref ?–130)
OSMOLALITY SERPL CALC.SUM OF ELEC: 279 MOSM/KG (ref 275–295)
PLATELET # BLD AUTO: 263 10(3)UL (ref 150–450)
POTASSIUM SERPL-SCNC: 4.1 MMOL/L (ref 3.5–5.1)
PROT SERPL-MCNC: 7.6 G/DL (ref 5.7–8.2)
RBC # BLD AUTO: 4.51 X10(6)UL
SODIUM SERPL-SCNC: 134 MMOL/L (ref 136–145)
TRIGL SERPL-MCNC: 102 MG/DL (ref 30–149)
TSI SER-ACNC: 1.29 UIU/ML (ref 0.55–4.78)
VLDLC SERPL CALC-MCNC: 19 MG/DL (ref 0–30)
WBC # BLD AUTO: 5.4 X10(3) UL (ref 4–11)

## 2024-11-04 PROCEDURE — 80050 GENERAL HEALTH PANEL: CPT | Performed by: NURSE PRACTITIONER

## 2024-11-04 PROCEDURE — 80061 LIPID PANEL: CPT | Performed by: NURSE PRACTITIONER

## 2024-11-15 NOTE — PROGRESS NOTES
Halina Zhao is a 49 year old female who presents for a complete physical exam:       Patient complains of:     Situational anxiety.   [place] [facility] [place] anxiety was signifcant due to recent life events.  Fluoxetine was increased to 30mg at ov with PRN xanax.  She is [place] [facility]  Starting outpatient program tomorrow.      Mild dyslipidemia.    watch diet.  Family hx CVA / CAD.  Will monitor.      Due for colonscopy.  She has not had screening,.  Discussed options and recommendation for colonsocopy but given above could do FIT until next year     Wt Readings from Last 6 Encounters:   11/18/24 209 lb (94.8 kg)   10/16/24 206 lb 3.2 oz (93.5 kg)   11/09/23 199 lb (90.3 kg)   08/07/23 190 lb (86.2 kg)   08/07/23 190 lb (86.2 kg)   09/26/22 194 lb 3.2 oz (88.1 kg)       Cholesterol, Total (mg/dL)   Date Value   11/04/2024 207 (H)   10/30/2023 183   07/02/2022 196     HDL Cholesterol (mg/dL)   Date Value   11/04/2024 55   10/30/2023 65 (H)   07/02/2022 56     LDL Cholesterol (mg/dL)   Date Value   11/04/2024 134 (H)   10/30/2023 98   07/02/2022 121 (H)     AST (U/L)   Date Value   11/04/2024 31   10/30/2023 14 (L)   07/02/2022 20   06/17/2015 15     ALT (U/L)   Date Value   11/04/2024 37   10/30/2023 26   07/02/2022 22   06/17/2015 12        Current Outpatient Medications   Medication Sig Dispense Refill    ARIPiprazole (ABILIFY) 5 MG Oral Tab Take one-half tablet by mouth in the morning for 1 week and then one tablet in the morning thereafter. 30 tablet 0    traZODone 50 MG Oral Tab Take one-half to one tablet by mouth at bedtime. 30 tablet 0    FLUoxetine 10 MG Oral Cap Take 1 capsule (10 mg total) by mouth daily. To take with 20mg to total 30mg daily 90 capsule 0    ALPRAZolam (XANAX) 0.25 MG Oral Tab Take 1 tablet (0.25 mg total) by mouth daily as needed for Anxiety. 20 tablet 0    FLUoxetine 20 MG Oral Cap Take 1 capsule (20 mg total) by mouth daily. To take with 10mg to total 30mg daily. 90  capsule 3    naproxen 500 MG Oral Tab Take 1 tablet (500 mg total) by mouth 2 (two) times daily as needed. 60 tablet 0    fluticasone propionate 50 MCG/ACT Nasal Suspension 1 spray by Nasal route 2 (two) times daily. 3 each 0      Past Medical History:    Anxiety    Depression    Essential hypertension    History of herniated intervertebral disc    Reported by patient      Past Surgical History:   Procedure Laterality Date    Appendectomy  12/2006    Appendectomy        Family History   Problem Relation Age of Onset    Other (Parkinson's) Father     Dementia Father     Heart Disorder Father     Depression Mother     Cancer Mother         endometrial    Diabetes Mother         type 2    Thyroid disease Mother     Endometrial Cancer Mother     Depression Maternal Grandmother     Alcohol and Other Disorders Associated Maternal Grandmother     Depression Maternal Grandfather     Alcohol and Other Disorders Associated Maternal Grandfather     Depression Sister     Alcohol and Other Disorders Associated Brother     Bipolar Disorder Brother     Substance Abuse Brother     Anxiety Brother     Depression Brother     Breast Cancer Maternal Aunt 65    Schizophrenia Paternal Aunt         possible    Breast Cancer Other            Health Maintenance  Immunizations: flu today   Immunization History   Administered Date(s) Administered    Covid-19 Vaccine Pfizer 30 mcg/0.3 ml 12/26/2020, 01/16/2021    DTAP 12/03/2013    FLUZONE 6 months and older PFS 0.5 ml (02580) 11/09/2023    HEP B 11/04/2013, 12/02/2013, 04/07/2014    Influenza Vaccine, trivalent (IIV3), PF 0.5mL (43472) 11/18/2024    TDAP 09/11/2018    Tb Intradermal Test 09/11/2018, 09/18/2018     Dental Visits: yes   Colon cancer screening:  she agrees to FIT   Health Maintenance   Topic Date Due    Colorectal Cancer Screening  08/18/2021      Gyne:     Health Maintenance   Topic Date Due    Pap Smear  11/09/2028            History of dysplasia:  No  Menstrual Cycle:  irregular   Breast Cancer Screening: order placed     Health Maintenance   Topic Date Due    Mammogram  02/05/2025        Bone Density:  na     Osteoporosis Prevention:    Osteoporosis Prevention was discussed.  Reviewed Calcium, Vitamin D supplementation and Weight Bearing Exercises.    Patient is performing weight bearing exercises.  Patient is currently taking Vitamin D supplementation.        REVIEW OF SYSTEMS:   GENERAL: feels well otherwise  SKIN: denies any unusual skin lesions  EYES:denies blurred vision or double vision  HEENT: denies nasal congestion, sinus pain or ST  LUNGS: denies shortness of breath with exertion  CARDIOVASCULAR: denies chest pain on exertion  GI: denies abdominal pain,denies heartburn  : denies dysuria, vaginal discharge or itching  MUSCULOSKELETAL: denies back pain  NEURO: denies headaches  PSYCH: as above       EXAM:   /82   Pulse 93   Temp 96.9 °F (36.1 °C) (Temporal)   Resp 16   Ht 5' 1.42\" (1.56 m)   Wt 209 lb (94.8 kg)   LMP 10/27/2024   SpO2 97%   BMI 38.96 kg/m²   Body mass index is 38.96 kg/m².   GENERAL: well developed, well nourished,in no apparent distress  SKIN: no rashes,no suspicious lesions  HEENT: atraumatic, normocephalic,ears and throat are clear  EYES:PERRLA, EOMI, conjunctiva are clear  NECK: supple,no adenopathy,  CHEST: no chest tenderness  BREAST: no dominant or suspicious mass  LUNGS: clear to auscultation  CARDIO: RRR without murmur  GI: good BS's,no masses, HSM or tenderness  MUSCULOSKELETAL: back is not tender,  EXTREMITIES: no edema  NEURO: Oriented times three,cranial nerves are intact,motor and sensory are grossly intact    ASSESSMENT AND PLAN:      HEALTH MAINTENANCE:  well woman with gyne  appt early 2025  flu today  agrees to FIT with her life events right now   aware the importance of colonoscopy in the future.  Mammogram [facility]    Encounter Diagnoses   Name Primary?    Routine general medical examination at a health care facility Yes     Encounter for screening mammogram for malignant neoplasm of breast     Anxiety  starting outpatient program with [facility]  meds managed by [facility].  Likes how she feels with 30mg prozac instead of 40mg  will discucss with them.  She has enough 30mg to continue for now.  Abilify added.       Posttraumatic stress disorder     Migraine with aura and without status migrainosus, not intractable  stable       Menorrhagia with irregular cycle     Screen for colon cancer        Orders Placed This Encounter   Procedures    Occult Blood, Fecal, Immunoassay (Blue cards) [E]    Fluzone trivalent vaccine, PF 0.5mL, 6mo+ (61796)       Meds & Refills for this Visit:  Requested Prescriptions      No prescriptions requested or ordered in this encounter       Imaging & Consults:  INFLUENZA VACCINE, TRI, PRESERV FREE, 0.5 ML  RITO WILLIAM 2D+3D SCREENING BILAT (CPT=77067/98202)    No follow-ups on file.  There are no Patient Instructions on file for this visit.

## 2024-11-18 ENCOUNTER — OFFICE VISIT (OUTPATIENT)
Dept: INTERNAL MEDICINE CLINIC | Facility: CLINIC | Age: 49
End: 2024-11-18
Payer: COMMERCIAL

## 2024-11-18 VITALS
WEIGHT: 209 LBS | OXYGEN SATURATION: 97 % | HEART RATE: 93 BPM | BODY MASS INDEX: 38.95 KG/M2 | HEIGHT: 61.42 IN | DIASTOLIC BLOOD PRESSURE: 82 MMHG | TEMPERATURE: 97 F | RESPIRATION RATE: 16 BRPM | SYSTOLIC BLOOD PRESSURE: 117 MMHG

## 2024-11-18 DIAGNOSIS — N92.1 MENORRHAGIA WITH IRREGULAR CYCLE: ICD-10-CM

## 2024-11-18 DIAGNOSIS — Z12.11 SCREEN FOR COLON CANCER: ICD-10-CM

## 2024-11-18 DIAGNOSIS — Z00.00 ROUTINE GENERAL MEDICAL EXAMINATION AT A HEALTH CARE FACILITY: Primary | ICD-10-CM

## 2024-11-18 DIAGNOSIS — Z12.31 ENCOUNTER FOR SCREENING MAMMOGRAM FOR MALIGNANT NEOPLASM OF BREAST: ICD-10-CM

## 2024-11-18 DIAGNOSIS — G43.109 MIGRAINE WITH AURA AND WITHOUT STATUS MIGRAINOSUS, NOT INTRACTABLE: ICD-10-CM

## 2024-11-18 DIAGNOSIS — F41.9 ANXIETY: ICD-10-CM

## 2024-11-18 DIAGNOSIS — F43.10 POSTTRAUMATIC STRESS DISORDER: ICD-10-CM

## 2024-11-20 PROBLEM — F41.1 GAD (GENERALIZED ANXIETY DISORDER): Status: ACTIVE | Noted: 2024-11-20

## 2024-11-20 PROBLEM — F41.9 ANXIETY DISORDER: Status: ACTIVE | Noted: 2024-11-20

## 2024-11-20 PROBLEM — F43.10 PTSD (POST-TRAUMATIC STRESS DISORDER): Status: ACTIVE | Noted: 2024-11-20

## 2024-11-20 PROBLEM — F32.A DEPRESSION: Status: ACTIVE | Noted: 2024-11-20

## 2024-11-25 ENCOUNTER — LAB ENCOUNTER (OUTPATIENT)
Dept: LAB | Age: 49
End: 2024-11-25
Attending: Other
Payer: COMMERCIAL

## 2024-11-25 DIAGNOSIS — E55.9 VITAMIN D DEFICIENCY: ICD-10-CM

## 2024-11-25 DIAGNOSIS — E87.1 HYPONATREMIA: ICD-10-CM

## 2024-11-25 DIAGNOSIS — F32.A DEPRESSION, UNSPECIFIED DEPRESSION TYPE: ICD-10-CM

## 2024-11-25 DIAGNOSIS — Z51.81 THERAPEUTIC DRUG MONITORING: ICD-10-CM

## 2024-11-25 LAB
ANION GAP SERPL CALC-SCNC: 9 MMOL/L (ref 0–18)
BUN BLD-MCNC: 21 MG/DL (ref 9–23)
CALCIUM BLD-MCNC: 10 MG/DL (ref 8.7–10.4)
CHLORIDE SERPL-SCNC: 106 MMOL/L (ref 98–112)
CO2 SERPL-SCNC: 26 MMOL/L (ref 21–32)
CREAT BLD-MCNC: 0.99 MG/DL
EGFRCR SERPLBLD CKD-EPI 2021: 69 ML/MIN/1.73M2 (ref 60–?)
FASTING STATUS PATIENT QL REPORTED: YES
FOLATE SERPL-MCNC: 22.7 NG/ML (ref 5.4–?)
GLUCOSE BLD-MCNC: 78 MG/DL (ref 70–99)
OSMOLALITY SERPL CALC.SUM OF ELEC: 294 MOSM/KG (ref 275–295)
POTASSIUM SERPL-SCNC: 4.1 MMOL/L (ref 3.5–5.1)
SODIUM SERPL-SCNC: 141 MMOL/L (ref 136–145)
VIT B12 SERPL-MCNC: 531 PG/ML (ref 211–911)
VIT D+METAB SERPL-MCNC: 24.1 NG/ML (ref 30–100)

## 2024-11-25 PROCEDURE — 82746 ASSAY OF FOLIC ACID SERUM: CPT

## 2024-11-25 PROCEDURE — 80048 BASIC METABOLIC PNL TOTAL CA: CPT

## 2024-11-25 PROCEDURE — 82306 VITAMIN D 25 HYDROXY: CPT

## 2024-11-25 PROCEDURE — 36415 COLL VENOUS BLD VENIPUNCTURE: CPT

## 2024-11-25 PROCEDURE — 82607 VITAMIN B-12: CPT

## 2024-11-26 PROBLEM — F42.9 OBSESSIVE-COMPULSIVE DISORDER: Status: ACTIVE | Noted: 2024-11-26

## 2024-11-26 PROBLEM — E55.9 VITAMIN D DEFICIENCY: Status: ACTIVE | Noted: 2024-11-26

## 2025-03-05 ENCOUNTER — HOSPITAL ENCOUNTER (OUTPATIENT)
Dept: MAMMOGRAPHY | Age: 50
Discharge: HOME | End: 2025-03-05
Attending: NURSE PRACTITIONER
Payer: COMMERCIAL

## 2025-03-05 DIAGNOSIS — Z12.31 ENCOUNTER FOR SCREENING MAMMOGRAM FOR MALIGNANT NEOPLASM OF BREAST: ICD-10-CM

## 2025-03-05 PROCEDURE — 77067 SCR MAMMO BI INCL CAD: CPT | Performed by: NURSE PRACTITIONER

## 2025-03-05 PROCEDURE — 77063 BREAST TOMOSYNTHESIS BI: CPT | Performed by: NURSE PRACTITIONER

## (undated) NOTE — ED AVS SNAPSHOT
Cade Matthews   MRN: [id number]    Department:  [facility] Emergency Department   Date of Visit:  7/13/2018           Disclosure     Insurance plans vary and the physician(s) referred by the ER may not be covered by your plan.  Please contact you tell this physician (or your personal doctor if your instructions are to return to your personal doctor) about any new or lasting problems. The primary care or specialist physician will see patients referred from the [facility] Emergency Department.  Tin Gregory

## (undated) NOTE — ED AVS SNAPSHOT
Josué Brown   MRN: [id number]    Department:  [facility] Emergency Department   Date of Visit:  2/27/2019           Disclosure     Insurance plans vary and the physician(s) referred by the ER may not be covered by your plan.  Please contact you tell this physician (or your personal doctor if your instructions are to return to your personal doctor) about any new or lasting problems. The primary care or specialist physician will see patients referred from the [facility] Emergency Department.  Lisa Moe

## (undated) NOTE — LETTER
Date & Time: 8/7/2023, 9:05 AM  Patient: Lyle Meza  Encounter Provider(s):    REX Ledezma       To Whom It May Concern:    [employer] was seen and treated in our department on 8/7/2023. She should not return to work until 8/9/23 .     If you have any questions or concerns, please do not hesitate to call.        _____________________________  Physician/APC Signature

## (undated) NOTE — LETTER
Date & Time: 8/16/2020, 12:31 PM  Patient: Valery Cast  Encounter Provider(s):    Delphine Johnson       To Whom It May Concern:    Kaylee Batista was seen and treated in our department on 8/16/2020. She should not return to work until 8/20/20.     If